# Patient Record
Sex: MALE | Race: WHITE | Employment: OTHER | URBAN - METROPOLITAN AREA
[De-identification: names, ages, dates, MRNs, and addresses within clinical notes are randomized per-mention and may not be internally consistent; named-entity substitution may affect disease eponyms.]

---

## 2017-02-27 ENCOUNTER — OFFICE VISIT (OUTPATIENT)
Dept: VASCULAR SURGERY | Age: 77
End: 2017-02-27

## 2017-02-27 DIAGNOSIS — I83.93 VARICOSE VEINS OF BOTH LOWER EXTREMITIES: Primary | ICD-10-CM

## 2017-02-27 DIAGNOSIS — I80.01 PHLEBITIS OF SUPERFICIAL VEIN OF LOWER EXTREMITY, RIGHT: ICD-10-CM

## 2017-02-27 NOTE — PROCEDURES
[facility]   *** FINAL REPORT ***    Name: Chuck Shay  MRN: [id number]       Outpatient  : 07 Aug 1940  HIS Order #: [id number]  [id number] [facility] Visit #: [id number]  Date: 2017    TYPE OF TEST: Peripheral Venous Testing    REASON FOR TEST  VV with pain, swelling or edema, Thrombosis, specified vein    Right Leg:-  Deep venous thrombosis:           No  Superficial venous thrombosis:    Yes  Deep venous insufficiency:        No  Superficial venous insufficiency: Yes    Vein Mapping:    Diam.   Depth  (mm)    Right Great Saphenous Vein:    High Thigh:      6.7    Mid Thigh:       4.2    Low Thigh:       3.9    Knee:            3.5    High Calf:       4.4    Low Calf: Ankle:    Right Small Saphenous Vein:    SPJ:    Mid Calf: Ankle:    G[place]omini:  Accessory saph.:  Mckinney :  Barnett :      INTERPRETATION/FINDINGS  Duplex images were obtained using 2-D gray scale, color flow and  spectral doppler analysis. The reflux exam was performed in the reverse trendelenburg position. Right leg :  1. No evidence of deep venous reflux or thrombosis detected in the  common femoral, femoral, profunda, popliteal, posterior tibial or  peroneal veins visualized. 2. Acute vs subacute occlusive superficial venous thrombosis  identified in the varicose veins in the lateral upper to mid calf  segment. Age of thrombus difficult to determine. 3. Incompetent  vein in the mid thigh measuring 4.3mm with  4.7 seconds of reflux with multiple varices extending laterally from  the mid thigh to the mid to distal calf. 4. Incompetent great saphenous vein with isolated reflux at the below  knee level with varices noted. No reflux detected in the SFJ or thigh  segments assessed. The GSV leaves the fascia in the upper to mid thigh   segment. 5. Small saphenous vein patent without evidence of reflux or thrombus  noted.   6. Multiphasic tibial doppler signals at rest.    ADDITIONAL COMMENTS  Bilateral order, only right leg completed. The patient was unable to  stay entire length of exam due to another appt/time restraint. Notified Dina at Dr. Sharri Wesley office of findings. The patient will  call back to schedule left leg for another date of service. I have personally reviewed the data relevant to the interpretation of  this  study. TECHNOLOGIST: Indira Carlos RDMS  Signed: 02/27/2017 03:07 PM    PHYSICIAN: Renay Ovalle D.O.   Signed: 02/28/2017 09:36 AM

## 2017-03-03 ENCOUNTER — OFFICE VISIT (OUTPATIENT)
Dept: VASCULAR SURGERY | Age: 77
End: 2017-03-03

## 2017-03-03 DIAGNOSIS — I83.93 VARICOSE VEINS OF BOTH LOWER EXTREMITIES: ICD-10-CM

## 2017-03-03 DIAGNOSIS — I87.2 VENOUS (PERIPHERAL) INSUFFICIENCY: Primary | ICD-10-CM

## 2017-03-03 NOTE — PROCEDURES
[facility]   *** FINAL REPORT ***    Name: Mitchell Guzman  MRN: [id number]       Outpatient  : 07 Aug 1940  HIS Order #: [id number]  [id number] [facility] Visit #: [id number]  Date: 03 Mar 2017    TYPE OF TEST: Peripheral Venous Testing    REASON FOR TEST  Varicose veins, Venous Insufficiency    Left Leg:-  Deep venous thrombosis:           No  Superficial venous thrombosis:    No  Deep venous insufficiency:        Not examined  Superficial venous insufficiency: Yes    Abnormal Valve Closure Times (seconds):    Left Femoral:         2.4    Left GSV mid:         3.3    Left GSV distal:      5.0      INTERPRETATION/FINDINGS  Duplex images were obtained using 2-D gray scale, color flow and  spectral doppler analysis. 1. No evidence of deep venous thrombosis in the left common femoral,  femoral, profunda, popliteal, posterior tibial and peroneal veins in  the lower extremity. Deep venous reflux noted in the left femoral  vein in reverse trendelenburg. 2. Patent contralateral femoral vein without evidence of thrombus. 3. No reflux detected in the left sapheno-femoral junction of the left   great saphenous vein in reverse trendelenburg. Reflux detected in  the remaining levels of the left great saphenous vein. 4. No reflux detected in the sapheno-popliteal junction in reverse  trendelenburg. No reflux detected in the remaining levels of the left   lesser saphenous vein. 5. Triphasic doppler signals in the tibial vessel at rest.  See worksheet for details. ADDITIONAL COMMENTS    I have personally reviewed the data relevant to the interpretation of  this  study. TECHNOLOGIST: Carmen Oneil RVT, BS  Signed: 2017 03:06 PM    PHYSICIAN: Josiah Luis MD  Signed: 2017 08:33 AM

## 2017-03-10 ENCOUNTER — OFFICE VISIT (OUTPATIENT)
Dept: VASCULAR SURGERY | Age: 77
End: 2017-03-10

## 2017-03-10 VITALS
SYSTOLIC BLOOD PRESSURE: 122 MMHG | RESPIRATION RATE: 18 BRPM | WEIGHT: 190 LBS | DIASTOLIC BLOOD PRESSURE: 80 MMHG | HEIGHT: 66 IN | BODY MASS INDEX: 30.53 KG/M2 | HEART RATE: 60 BPM

## 2017-03-10 DIAGNOSIS — I80.9 PHLEBITIS AND THROMBOPHLEBITIS: ICD-10-CM

## 2017-03-10 DIAGNOSIS — I83.12 VARICOSE VEINS OF BOTH LOWER EXTREMITIES WITH INFLAMMATION: Primary | ICD-10-CM

## 2017-03-10 DIAGNOSIS — I83.11 VARICOSE VEINS OF BOTH LOWER EXTREMITIES WITH INFLAMMATION: Primary | ICD-10-CM

## 2017-03-10 RX ORDER — METFORMIN HYDROCHLORIDE 500 MG/1
TABLET ORAL 2 TIMES DAILY WITH MEALS
COMMUNITY
End: 2018-08-28 | Stop reason: SDUPTHER

## 2017-03-10 NOTE — MR AVS SNAPSHOT
Visit Information Date & Time Provider Department Dept. Phone Encounter #  
 3/10/2017  9:00 AM Rudy Burgos, [address] Vein/[facility] [phone] [id number] Follow-up Instructions Return if symptoms worsen or fail to improve. Upcoming Health Maintenance Date Due ZOSTER VACCINE AGE 60> 8/7/2001 GLAUCOMA SCREENING Q2Y 8/7/2006 Pneumococcal 65+ Low/Medium Risk (1 of 2 - PCV13) 8/7/2006 MEDICARE YEARLY EXAM 8/7/2006 INFLUENZA AGE 9 TO ADULT 8/1/2016 DTaP/Tdap/Td series (2 - Td) 4/15/2025 Allergies as of 3/10/2017  Review Complete On: 3/10/2017 By: Nandini Tsang LPN No Known Allergies Current Immunizations  Never Reviewed Name Date Tdap 4/15/2015  4:54 PM  
  
 Not reviewed this visit You Were Diagnosed With   
  
 Codes Comments Varicose veins of both lower extremities with inflammation    -  Primary ICD-10-CM: I83.12, I83.11 ICD-9-CM: 454.1 Phlebitis and thrombophlebitis     ICD-10-CM: I80.9 ICD-9-CM: 451.9 Vitals BP Pulse Resp Height(growth percentile) Weight(growth percentile) BMI  
 122/80 (BP 1 Location: Right arm, BP Patient Position: Sitting) 60 18 5' 6\" (1.676 m) 190 lb (86.2 kg) 30.67 kg/m2 Smoking Status Former Smoker Vitals History BMI and BSA Data Body Mass Index Body Surface Area  
 30.67 kg/m 2 2 m 2 Your Updated Medication List  
  
   
This list is accurate as of: 3/10/17  9:37 AM.  Always use your most recent med list.  
  
  
  
  
 acetaminophen-codeine 300-30 mg per tablet Commonly known as:  TYLENOL-CODEINE #3 Take 1 Tab by mouth every four (4) hours as needed for Pain. Max Daily Amount: 6 Tabs. glyBURIDE-metFORMIN 5-500 mg per tablet Commonly known as:  Adelita Barnett Take 1 Tab by mouth Daily (before breakfast). KEFLEX 500 mg capsule Generic drug:  cephALEXin Take 500 mg by mouth two (2) times a day. lisinopril-hydroCHLOROthiazide 20-12.5 mg per tablet Commonly known as:  Gary Goodson Take  by mouth daily. metFORMIN 500 mg tablet Commonly known as:  GLUCOPHAGE Take  by mouth two (2) times daily (with meals). ZETIA 10 mg tablet Generic drug:  ezetimibe Take  by mouth. Follow-up Instructions Return if symptoms worsen or fail to improve. Introducing [facility] SERVICES! Dear Geni Rankin: Thank you for requesting a [employer] account. Our records indicate that you already have an active [employer] account. You can access your account anytime at [email or web address] Did you know that you can access your hospital and ER discharge instructions at any time in [employer]? You can also review all of your test results from your hospital stay or ER visit. Additional Information If you have questions, please visit the Frequently Asked Questions section of the [employer] website at [email or web address] Remember, [employer] is NOT to be used for urgent needs. For medical emergencies, dial 911. Now available from your iPhone and Android! Please provide this summary of care documentation to your next provider. Your primary care clinician is listed as [facility]. If you have any questions after today's visit, please call [phone].

## 2017-03-10 NOTE — PROGRESS NOTES
Micha Drummond    Chief Complaint   Patient presents with    New Patient    Varicose Veins       History and Physical    Mr Anjali Roque is here at the request of cardiology, Dr Hernandez Graft  He was recently in for a visit with him  He showed him concern of a group of veins in his right calf that had recently become painful and tender (about one month in duration now)  He states he was aware of varicose veins and some leg swelling for years but had never had any concerns related to the veins until this area of tenderness became bothersome  He states no trauma to his area. He does not wear compression or any type of socks, so that was not a cause. He just awoke one morning with the tenderness. He did not recall any skin changes or temperature of this area, just that it was a tender \"lump\"  So it was recognized that this was venous in nature so reflux studies were ordered before he came    Past Medical History:   Diagnosis Date    Diabetes ([facility] [address]     Hypertension      There is no problem list on file for this patient. Past Surgical History:   Procedure Laterality Date    HX KNEE REPLACEMENT Right     HX ORTHOPAEDIC      rt knee twice     Current Outpatient Prescriptions   Medication Sig Dispense Refill    metFORMIN (GLUCOPHAGE) 500 mg tablet Take  by mouth two (2) times daily (with meals).  cephALEXin (KEFLEX) 500 mg capsule Take 500 mg by mouth two (2) times a day.  lisinopril-hydrochlorothiazide (PRINZIDE, ZESTORETIC) 20-12.5 mg per tablet Take  by mouth daily.  ezetimibe (ZETIA) 10 mg tablet Take  by mouth.  glyBURIDE-metFORMIN (GLUCOVANCE) 5-500 mg per tablet Take 1 Tab by mouth Daily (before breakfast).  acetaminophen-codeine (TYLENOL-CODEINE #3) 300-30 mg per tablet Take 1 Tab by mouth every four (4) hours as needed for Pain. Max Daily Amount: 6 Tabs.  12 Tab 0     No Known Allergies  Social History     Social History    Marital status:      Spouse name: N/A    Number of children: N/A    Years of education: N/A     Occupational History    Not on file. Social History Main Topics    Smoking status: Former Smoker    Smokeless tobacco: Not on file    Alcohol use No    Drug use: No    Sexual activity: Not on file     Other Topics Concern    Not on file     Social History Narrative      No family history on file. Review of Systems    Review of Systems - History obtained from the patient  General ROS: negative  Psychological ROS: negative  Ophthalmic ROS: negative  Respiratory ROS: negative  Cardiovascular ROS: negative  Gastrointestinal ROS: negative  Musculoskeletal ROS: positive for - joint stiffness  Neurological ROS: negative  Dermatological ROS: negative  Vascular ROS: varicose veins and edema        Physical Exam:    Visit Vitals    /80 (BP 1 Location: Right arm, BP Patient Position: Sitting)    Pulse 60    Resp 18    Ht 5' 6\" (1.676 m)    Wt 190 lb (86.2 kg)    BMI 30.67 kg/m2      General:  Alert, cooperative, no distress. Head:  Normocephalic, without obvious abnormality, atraumatic. Eyes:    Conjunctivae/corneas clear. Pupils equal, round, reactive to light. Extraocular movements intact. Extremities: Extremities normal, atraumatic, no cyanosis, trace edema. He does have scattered small varicose veins throughout both legs. A cluster on lateral right calf is consistent with phlebitis, as described on doppler below   Pulses: 1+ and symmetric all extremities. Skin: Venous stasis pigmentation but no signs of cellulitis. Vascular studies:    1. No evidence of deep venous thrombosis in the left common femoral,  femoral, profunda, popliteal, posterior tibial and peroneal veins in  the lower extremity. Deep venous reflux noted in the left femoral  vein in reverse trendelenburg. 2. Patent contralateral femoral vein without evidence of thrombus.   3. No reflux detected in the left sapheno-femoral junction of the left  great saphenous vein in reverse trendelenburg. Reflux detected in  the remaining levels of the left great saphenous vein. 4. No reflux detected in the sapheno-popliteal junction in reverse  trendelenburg. No reflux detected in the remaining levels of the left  lesser saphenous vein. 5. Triphasic doppler signals in the tibial vessel at rest.    Right leg :  1. No evidence of deep venous reflux or thrombosis detected in the  common femoral, femoral, profunda, popliteal, posterior tibial or  peroneal veins visualized. 2. Acute vs subacute occlusive superficial venous thrombosis  identified in the varicose veins in the lateral upper to mid calf  segment. Age of thrombus difficult to determine. 3. Incompetent  vein in the mid thigh measuring 4.3mm with  4.7 seconds of reflux with multiple varices extending laterally from  the mid thigh to the mid to distal calf. 4. Incompetent great saphenous vein with isolated reflux at the below  knee level with varices noted. No reflux detected in the SFJ or thigh  segments assessed. The GSV leaves the fascia in the upper to mid thigh  segment. 5. Small saphenous vein patent without evidence of reflux or thrombus  noted. 6. Multiphasic tibial doppler signals at rest.     Impression and Plan:  1. Varicose veins of both lower extremities with inflammation    2. Phlebitis and thrombophlebitis      Orders Placed This Encounter    metFORMIN (GLUCOPHAGE) 500 mg tablet     Overall mr Richi Lyon is not bothered enough by any significant symptoms of his veins to be interested at this time for any vein procedures  I did explain what phlebitis is and how it is treated - warm compresses, ibuprofen as needed, and compression. He is adamant he will not wear any compression. So I suggested mostly the warm compresses. This should gradually resolve. It does seem somewhat better than it had been when he first noticed.    Being that he is not interested in procedures, I did still give him literature to review on venous disease and treatment and explained types of symptoms that can bother patients to consider other therapy. So, if he does experience any problems to let us know and we will be happy to see him back    Follow-up Disposition:  Return if symptoms worsen or fail to improve. DARSHANA Baptiste    Portions of this note have been created using voice recognition software.

## 2017-06-26 ENCOUNTER — HOSPITAL ENCOUNTER (OUTPATIENT)
Age: 77
Discharge: HOME | End: 2017-06-26
Attending: OTOLARYNGOLOGY
Payer: MEDICARE

## 2017-06-26 DIAGNOSIS — H91.8X1 TONE DEAFNESS, RIGHT: ICD-10-CM

## 2017-06-26 LAB — CREAT UR-MCNC: 1.2 MG/DL (ref 0.6–1.3)

## 2017-06-26 PROCEDURE — 74011250636 HC RX REV CODE- 250/636: Performed by: OTOLARYNGOLOGY

## 2017-06-26 PROCEDURE — 70553 MRI BRAIN STEM W/O & W/DYE: CPT

## 2017-06-26 PROCEDURE — A9585 GADOBUTROL INJECTION: HCPCS | Performed by: OTOLARYNGOLOGY

## 2017-06-26 PROCEDURE — 82565 ASSAY OF CREATININE: CPT

## 2017-06-26 RX ADMIN — GADOBUTROL 10 ML: 604.72 INJECTION INTRAVENOUS at 11:00

## 2017-10-23 ENCOUNTER — APPOINTMENT (OUTPATIENT)
Dept: CT IMAGING | Age: 77
DRG: 066 | End: 2017-10-23
Attending: EMERGENCY MEDICINE
Payer: MEDICARE

## 2017-10-23 ENCOUNTER — HOSPITAL ENCOUNTER (INPATIENT)
Age: 77
LOS: 2 days | Discharge: HOME | DRG: 066 | End: 2017-10-25
Attending: EMERGENCY MEDICINE | Admitting: HOSPITALIST
Payer: MEDICARE

## 2017-10-23 DIAGNOSIS — I63.9 CEREBROVASCULAR ACCIDENT (CVA), UNSPECIFIED MECHANISM (HCC): Primary | ICD-10-CM

## 2017-10-23 PROBLEM — R29.898 RIGHT HAND WEAKNESS: Status: ACTIVE | Noted: 2017-10-23

## 2017-10-23 LAB
ANION GAP SERPL CALC-SCNC: 7 MMOL/L (ref 3–18)
APTT PPP: 33 SEC (ref 23–36.4)
BASOPHILS # BLD: 0 K/UL (ref 0–0.06)
BASOPHILS NFR BLD: 1 % (ref 0–2)
BUN SERPL-MCNC: 26 MG/DL (ref 7–18)
BUN/CREAT SERPL: 20 (ref 12–20)
CALCIUM SERPL-MCNC: 9.1 MG/DL (ref 8.5–10.1)
CHLORIDE SERPL-SCNC: 101 MMOL/L (ref 100–108)
CK MB CFR SERPL CALC: 1.5 % (ref 0–4)
CK MB SERPL-MCNC: 2.3 NG/ML (ref 5–25)
CK SERPL-CCNC: 156 U/L (ref 39–308)
CO2 SERPL-SCNC: 29 MMOL/L (ref 21–32)
CREAT SERPL-MCNC: 1.32 MG/DL (ref 0.6–1.3)
DIFFERENTIAL METHOD BLD: ABNORMAL
EOSINOPHIL # BLD: 0.3 K/UL (ref 0–0.4)
EOSINOPHIL NFR BLD: 4 % (ref 0–5)
ERYTHROCYTE [DISTWIDTH] IN BLOOD BY AUTOMATED COUNT: 12.3 % (ref 11.6–14.5)
GLUCOSE BLD STRIP.AUTO-MCNC: 121 MG/DL (ref 70–110)
GLUCOSE SERPL-MCNC: 194 MG/DL (ref 74–99)
HCT VFR BLD AUTO: 39.6 % (ref 36–48)
HGB BLD-MCNC: 13.8 G/DL (ref 13–16)
INR PPP: 0.9 (ref 0.8–1.2)
LYMPHOCYTES # BLD: 2.3 K/UL (ref 0.9–3.6)
LYMPHOCYTES NFR BLD: 31 % (ref 21–52)
MCH RBC QN AUTO: 32.5 PG (ref 24–34)
MCHC RBC AUTO-ENTMCNC: 34.8 G/DL (ref 31–37)
MCV RBC AUTO: 93.2 FL (ref 74–97)
MONOCYTES # BLD: 0.8 K/UL (ref 0.05–1.2)
MONOCYTES NFR BLD: 11 % (ref 3–10)
NEUTS SEG # BLD: 3.9 K/UL (ref 1.8–8)
NEUTS SEG NFR BLD: 53 % (ref 40–73)
PLATELET # BLD AUTO: 263 K/UL (ref 135–420)
PMV BLD AUTO: 10.1 FL (ref 9.2–11.8)
POTASSIUM SERPL-SCNC: 4.2 MMOL/L (ref 3.5–5.5)
PROTHROMBIN TIME: 12.1 SEC (ref 11.5–15.2)
RBC # BLD AUTO: 4.25 M/UL (ref 4.7–5.5)
SODIUM SERPL-SCNC: 137 MMOL/L (ref 136–145)
TROPONIN I SERPL-MCNC: <0.02 NG/ML (ref 0–0.06)
WBC # BLD AUTO: 7.3 K/UL (ref 4.6–13.2)

## 2017-10-23 PROCEDURE — 74011250637 HC RX REV CODE- 250/637: Performed by: EMERGENCY MEDICINE

## 2017-10-23 PROCEDURE — 94762 N-INVAS EAR/PLS OXIMTRY CONT: CPT

## 2017-10-23 PROCEDURE — 85025 COMPLETE CBC W/AUTO DIFF WBC: CPT | Performed by: EMERGENCY MEDICINE

## 2017-10-23 PROCEDURE — 85730 THROMBOPLASTIN TIME PARTIAL: CPT | Performed by: EMERGENCY MEDICINE

## 2017-10-23 PROCEDURE — 80048 BASIC METABOLIC PNL TOTAL CA: CPT | Performed by: EMERGENCY MEDICINE

## 2017-10-23 PROCEDURE — 96361 HYDRATE IV INFUSION ADD-ON: CPT

## 2017-10-23 PROCEDURE — 99285 EMERGENCY DEPT VISIT HI MDM: CPT

## 2017-10-23 PROCEDURE — 85610 PROTHROMBIN TIME: CPT | Performed by: EMERGENCY MEDICINE

## 2017-10-23 PROCEDURE — 96360 HYDRATION IV INFUSION INIT: CPT

## 2017-10-23 PROCEDURE — 65660000000 HC RM CCU STEPDOWN

## 2017-10-23 PROCEDURE — 82962 GLUCOSE BLOOD TEST: CPT

## 2017-10-23 PROCEDURE — 93005 ELECTROCARDIOGRAM TRACING: CPT

## 2017-10-23 PROCEDURE — 82550 ASSAY OF CK (CPK): CPT | Performed by: EMERGENCY MEDICINE

## 2017-10-23 PROCEDURE — 70450 CT HEAD/BRAIN W/O DYE: CPT

## 2017-10-23 PROCEDURE — 74011250636 HC RX REV CODE- 250/636: Performed by: EMERGENCY MEDICINE

## 2017-10-23 RX ORDER — GUAIFENESIN 100 MG/5ML
243 LIQUID (ML) ORAL
Status: COMPLETED | OUTPATIENT
Start: 2017-10-23 | End: 2017-10-23

## 2017-10-23 RX ORDER — SODIUM CHLORIDE 9 MG/ML
125 INJECTION, SOLUTION INTRAVENOUS ONCE
Status: COMPLETED | OUTPATIENT
Start: 2017-10-23 | End: 2017-10-23

## 2017-10-23 RX ADMIN — SODIUM CHLORIDE 125 ML/HR: 9 INJECTION, SOLUTION INTRAVENOUS at 19:36

## 2017-10-23 RX ADMIN — ASPIRIN 81 MG 243 MG: 81 TABLET ORAL at 21:37

## 2017-10-23 NOTE — ED TRIAGE NOTES
Pt. Complains of arm going numb and not moving. He states he was not able to move his right arm at all. He states this happened Saturday. He states he is now able to move it, but he has no fine motor movements.

## 2017-10-23 NOTE — ED PROVIDER NOTES
HPI Comments: Adan Nance is a 69 y.o. male with hx of HTN and DM presenting to the ED with c/o right arm weakness and numbness. Pt states sx have had some improvement, however he is unable to write and perform other fine motor skills. Reports onset of sx was 2 days ago, states he was trimming bushes when all of a sudden his right hand went numb. Pt denies any hx of CVA, blood clots or other heart diseases. Notes he had a pain on the top center of his head, but resolved on its own. Pt denies vision changes, CP, SOB, trouble swallowing, dizziness, back pain, leg or arm sx. Pt states he took ASA PTA. Denies smoking, however pt states he used to chew tobacco years ago. Pt has no complaints at the moment. PCP: Ze Farley MD      The history is provided by the patient. Past Medical History:   Diagnosis Date    Diabetes ([facility] [address]     Hypertension        Past Surgical History:   Procedure Laterality Date    HX KNEE REPLACEMENT Right     HX ORTHOPAEDIC      rt knee twice         History reviewed. No pertinent family history. Social History     Social History    Marital status:      Spouse name: N/A    Number of children: N/A    Years of education: N/A     Occupational History    Not on file. Social History Main Topics    Smoking status: Former Smoker    Smokeless tobacco: Former User    Alcohol use No    Drug use: No    Sexual activity: Not on file     Other Topics Concern    Not on file     Social History Narrative         ALLERGIES: Review of patient's allergies indicates no known allergies. Review of Systems   Constitutional: Negative for diaphoresis and fever. HENT: Negative for congestion and trouble swallowing. Respiratory: Negative for cough and shortness of breath. Cardiovascular: Negative for chest pain and leg swelling. Gastrointestinal: Negative for abdominal pain and nausea. Musculoskeletal: Negative for back pain. Skin: Negative for rash. Neurological: Positive for weakness and numbness. Negative for dizziness and headaches. Right arm weakness and numbness      All other systems reviewed and are negative. Vitals:    10/23/17 2215 10/23/17 2244 10/24/17 0000 10/24/17 0200   BP: 138/83 138/83 146/82 115/65   Pulse: 82 78 70 70   Resp: 18 20 19 18   Temp:  98.2 °F (36.8 °C) 98.1 °F (36.7 °C) 97.8 °F (36.6 °C)   SpO2: (!) 89% 99% 95% 95%   Weight:   88.3 kg (194 lb 9.6 oz)    Height:   5' 6\" (1.676 m)             Physical Exam   Constitutional: He is oriented to person, place, and time. He appears well-developed and well-nourished. HENT:   Head: Normocephalic and atraumatic. Eyes: Pupils are equal, round, and reactive to light. Neck: Neck supple. Cardiovascular: Normal rate. No murmur heard. Pulmonary/Chest: Effort normal. He has no wheezes. Abdominal: Soft. There is no tenderness. Musculoskeletal: He exhibits no tenderness. Neurological: He is alert and oriented to person, place, and time. Normal sensation and strength    Skin: No pallor. Nursing note and vitals reviewed.        [facility]  ED Course       Procedures      Vitals:  Patient Vitals for the past 12 hrs:   Temp Pulse Resp BP SpO2   10/24/17 0200 97.8 °F (36.6 °C) 70 18 115/65 95 %   10/24/17 0000 98.1 °F (36.7 °C) 70 19 146/82 95 %   10/23/17 2244 98.2 °F (36.8 °C) 78 20 138/83 99 %   10/23/17 2215 - 82 18 138/83 (!) 89 %   10/23/17 2145 - 79 19 148/83 100 %   10/23/17 2130 - 83 16 152/90 95 %   10/23/17 2115 - 78 19 137/79 100 %   10/23/17 2100 - 75 14 135/76 97 %   10/23/17 2030 - 76 15 114/55 96 %   10/23/17 2015 - 77 18 106/57 94 %   10/23/17 2012 - 76 17 - 94 %   10/23/17 2010 - 75 11 - 97 %   10/23/17 2008 - - - 115/64 -   10/23/17 1945 - 78 18 109/60 96 %   10/23/17 1901 98.6 °F (37 °C) 88 20 126/75 96 %         Medications ordered:   Medications   lisinopril-hydroCHLOROthiazide (PRINZIDE, ZESTORETIC) 20-12.5 mg per tablet 1 Tab (not administered) ezetimibe (ZETIA) tablet 10 mg (not administered)   sodium chloride (NS) flush 5-10 mL (10 mL IntraVENous Given 10/24/17 0239)   sodium chloride (NS) flush 5-10 mL (not administered)   heparin (porcine) injection 5,000 Units (5,000 Units SubCUTAneous Given 10/24/17 0237)   aspirin (ASPIRIN) tablet 325 mg (not administered)   0.9% sodium chloride infusion (0 mL/hr IntraVENous Stopped 10/23/17 2248)   aspirin chewable tablet 243 mg (243 mg Oral Given 10/23/17 2137)         Lab findings:  Recent Results (from the past 12 hour(s))   CBC WITH AUTOMATED DIFF    Collection Time: 10/23/17  7:15 PM   Result Value Ref Range    WBC 7.3 4.6 - 13.2 K/uL    RBC 4.25 (L) 4.70 - 5.50 M/uL    HGB 13.8 13.0 - 16.0 g/dL    HCT 39.6 36.0 - 48.0 %    MCV 93.2 74.0 - 97.0 FL    MCH 32.5 24.0 - 34.0 PG    MCHC 34.8 31.0 - 37.0 g/dL    RDW 12.3 11.6 - 14.5 %    PLATELET 611 071 - 796 K/uL    MPV 10.1 9.2 - 11.8 FL    NEUTROPHILS 53 40 - 73 %    LYMPHOCYTES 31 21 - 52 %    MONOCYTES 11 (H) 3 - 10 %    EOSINOPHILS 4 0 - 5 %    BASOPHILS 1 0 - 2 %    ABS. NEUTROPHILS 3.9 1.8 - 8.0 K/UL    ABS. LYMPHOCYTES 2.3 0.9 - 3.6 K/UL    ABS. MONOCYTES 0.8 0.05 - 1.2 K/UL    ABS. EOSINOPHILS 0.3 0.0 - 0.4 K/UL    ABS.  BASOPHILS 0.0 0.0 - 0.06 K/UL    DF AUTOMATED     METABOLIC PANEL, BASIC    Collection Time: 10/23/17  7:15 PM   Result Value Ref Range    Sodium 137 136 - 145 mmol/L    Potassium 4.2 3.5 - 5.5 mmol/L    Chloride 101 100 - 108 mmol/L    CO2 29 21 - 32 mmol/L    Anion gap 7 3.0 - 18 mmol/L    Glucose 194 (H) 74 - 99 mg/dL    BUN 26 (H) 7.0 - 18 MG/DL    Creatinine 1.32 (H) 0.6 - 1.3 MG/DL    BUN/Creatinine ratio 20 12 - 20      GFR est AA >60 >60 ml/min/1.73m2    GFR est non-AA 53 (L) >60 ml/min/1.73m2    Calcium 9.1 8.5 - 10.1 MG/DL   CARDIAC PANEL,(CK, CKMB & TROPONIN)    Collection Time: 10/23/17  7:15 PM   Result Value Ref Range     39 - 308 U/L    CK - MB 2.3 <3.6 ng/ml    CK-MB Index 1.5 0.0 - 4.0 %    Troponin-I, Qt. <0.02 0.00 - 0.06 NG/ML   PROTHROMBIN TIME + INR    Collection Time: 10/23/17  7:15 PM   Result Value Ref Range    Prothrombin time 12.1 11.5 - 15.2 sec    INR 0.9 0.8 - 1.2     PTT    Collection Time: 10/23/17  7:15 PM   Result Value Ref Range    aPTT 33.0 23.0 - 36.4 SEC   EKG, 12 LEAD, INITIAL    Collection Time: 10/23/17  7:24 PM   Result Value Ref Range    Ventricular Rate 86 BPM    Atrial Rate 86 BPM    P-R Interval 180 ms    QRS Duration 90 ms    Q-T Interval 366 ms    QTC Calculation (Bezet) 437 ms    Calculated P Axis 45 degrees    Calculated R Axis 0 degrees    Calculated T Axis 45 degrees    Diagnosis       Normal sinus rhythm  Normal ECG  When compared with ECG of 21-AUG-2013 09:59,  No significant change was found     GLUCOSE, POC    Collection Time: 10/23/17 11:38 PM   Result Value Ref Range    Glucose (POC) 121 (H) 70 - 110 mg/dL           X-Ray, CT or other radiology findings or impressions:  CT HEAD WO CONT   No evidence of intracranial hemorrhages.     Finding suspicious for acute lacunar infarction in right side of mid mary. Progress notes, Consult notes or additional Procedure notes:     8:35 PM Consult: I discussed care with Dr. Byron Ferrer (Teleneurology). It was a standard discussion including patient history, chief complaint, available diagnostic results, and predicted treatment course. Will consult pt.     8:56 PM Consult: I discussed care with Dr. Byron Ferrer (Teleneurology). It was a standard discussion including patient history, chief complaint, available diagnostic results, and predicted treatment course. Recommends admission and ASA/Lipitor. 9:17 PM Consult: I discussed care with Dr. Cynthia Gavin (Hospitalist). It was a standard discussion including patient history, chief complaint, available diagnostic results, and predicted treatment course. Agrees to admit pt. Disposition:  Diagnosis:   1.  Cerebrovascular accident (CVA), unspecified mechanism ([facility] [address]        Disposition: Admit    Follow-up Information     None           Current Discharge Medication List      CONTINUE these medications which have NOT CHANGED    Details   metFORMIN (GLUCOPHAGE) 500 mg tablet Take  by mouth two (2) times daily (with meals). lisinopril-hydrochlorothiazide (PRINZIDE, ZESTORETIC) 20-12.5 mg per tablet Take  by mouth daily. ezetimibe (ZETIA) 10 mg tablet Take  by mouth.      glyBURIDE-metFORMIN (GLUCOVANCE) 5-500 mg per tablet Take 1 Tab by mouth Daily (before breakfast). [facility] acting as a scribe for and in the presence of Carmen Mcdermott MD      October 23, 2017 at 7:10 PM       Provider Attestation:      I personally performed the services described in the documentation, reviewed the documentation, as recorded by the scribe in my presence, and it accurately and completely records my words and actions.  October 23, 2017 at 7:10 PM - Carmen Mcdermott MD                           .

## 2017-10-23 NOTE — IP AVS SNAPSHOT
[address] [address] Sarah Atwood Patient: Nicolle Chris MRN: [id number] JFD:[birth year] My Medications STOP taking these medications ZETIA 10 mg tablet Generic drug:  ezetimibe TAKE these medications as instructed Instructions Each Dose to Equal  
 Morning Noon Evening Bedtime  
 aspirin delayed-release 81 mg tablet Your last dose was: Your next dose is: Take 1 Tab by mouth daily. 81 mg  
    
   
   
   
  
 atorvastatin 40 mg tablet Commonly known as:  LIPITOR Your last dose was: Your next dose is: Take 1 Tab by mouth daily. 40 mg  
    
   
   
   
  
 clopidogrel 75 mg Tab Commonly known as:  PLAVIX Start taking on:  10/26/2017 Your last dose was: Your next dose is: Take 1 Tab by mouth daily. 75 mg  
    
   
   
   
  
 glyBURIDE-metFORMIN 5-500 mg per tablet Commonly known as:  Nickola Paco Your last dose was: Your next dose is: Take 1 Tab by mouth Daily (before breakfast). 1 Tab  
    
   
   
   
  
 lisinopril-hydroCHLOROthiazide 20-12.5 mg per tablet Commonly known as:  Leander Mg Your last dose was: Your next dose is: Take  by mouth daily. metFORMIN 500 mg tablet Commonly known as:  GLUCOPHAGE Your last dose was: Your next dose is: Take  by mouth two (2) times daily (with meals). Where to Get Your Medications Information on where to get these meds will be given to you by the nurse or doctor. ! Ask your nurse or doctor about these medications  
  aspirin delayed-release 81 mg tablet  
 atorvastatin 40 mg tablet  
 clopidogrel 75 mg Tab

## 2017-10-23 NOTE — IP AVS SNAPSHOT
Summary of Care Report The Summary of Care report has been created to help improve care coordination. Users with access to [employer] or [address] (Web-based application) may access additional patient information including the Discharge Summary. If you are not currently a [address] user and need more information, please call the number listed below in the [place] [address] section and ask to be connected with Medical Records. Facility Information Name Address Phone [address] [facility] [address] [phone] Patient Information Patient Name Sex  Louis Mcdonough ([id number]) Male 1940 Discharge Information Admitting Provider Service Area Unit Dorothy Rene MD / [address] [facility] / [phone] Discharge Provider Discharge Date/Time Discharge Disposition Destination (none) 10/25/2017 (Pending) AHR (none) Patient Language Language ENGLISH [13] Hospital Problems as of 10/25/2017  Reviewed: 2016  7:23 AM by Sudhakar Slade MD  
  
  
  
 Class Noted - Resolved Last Modified POA Active Problems Right hand weakness  10/23/2017 - Present 10/23/2017 by Benjamin Stevenson MD Unknown Entered by Benjamin Stevenson MD  
  CVA (cerebral vascular accident) [facility])  10/24/2017 - Present 10/24/2017 by Dorothy Rene MD Unknown Entered by Dorothy Rene MD  
  
Non-Hospital Problems as of 10/25/2017  Reviewed: 2016  7:23 AM by Sudhakar Slade MD  
 None You are allergic to the following No active allergies Current Discharge Medication List  
  
START taking these medications Dose & Instructions Dispensing Information Comments  
 aspirin delayed-release 81 mg tablet Dose:  81 mg Take 1 Tab by mouth daily. Quantity:  90 Tab Refills:  0 atorvastatin 40 mg tablet Commonly known as:  LIPITOR Dose:  40 mg Take 1 Tab by mouth daily. Quantity:  30 Tab Refills:  0  
   
 clopidogrel 75 mg Tab Commonly known as:  PLAVIX Start taking on:  10/26/2017 Dose:  75 mg Take 1 Tab by mouth daily. Quantity:  90 Tab Refills:  0 CONTINUE these medications which have NOT CHANGED Dose & Instructions Dispensing Information Comments  
 glyBURIDE-metFORMIN 5-500 mg per tablet Commonly known as:  Lucy Lee Dose:  1 Tab Take 1 Tab by mouth Daily (before breakfast). Refills:  0  
   
 lisinopril-hydroCHLOROthiazide 20-12.5 mg per tablet Commonly known as:  Kathalene Rockers Take  by mouth daily. Refills:  0  
   
 metFORMIN 500 mg tablet Commonly known as:  GLUCOPHAGE Take  by mouth two (2) times daily (with meals). Refills:  0 STOP taking these medications Comments ZETIA 10 mg tablet Generic drug:  ezetimibe Current Immunizations Name Date Tdap 4/15/2015 Follow-up Information Follow up With Details Comments Contact Info Diane Moody MD  waiting for a return call from this office. Placed call twice, w/no answer.[facility] [address] [facility] [address] 
[phone] Discharge Instructions Patient armband removed and shredded [employer] Activation Thank you for requesting access to [employer]. Please follow the instructions below to securely access and download your online medical record. [employer] allows you to send messages to your doctor, view your test results, renew your prescriptions, schedule appointments, and more. How Do I Sign Up? 1. In your internet browser, go to [email or web address] 
2. Click on the First Time User? Click Here link in the Sign In box. You will be redirect to the New Member Sign Up page. 3. Enter your [employer] Access Code exactly as it appears below.  You will not need to use this code after youve completed the sign-up process. If you do not sign up before the expiration date, you must request a new code. [employer] Access Code: Activation code not generated Current [employer] Status: Active (This is the date your [employer] access code will ) 4. Enter the last four digits of your Social Security Number (xxxx) and Date of Birth (mm/dd/yyyy) as indicated and click Submit. You will be taken to the next sign-up page. 5. Create a DGITt ID. This will be your [employer] login ID and cannot be changed, so think of one that is secure and easy to remember. 6. Create a [employer] password. You can change your password at any time. 7. Enter your Password Reset Question and Answer. This can be used at a later time if you forget your password. 8. Enter your e-mail address. You will receive e-mail notification when new information is available in [address] 9. Click Sign Up. You can now view and download portions of your medical record. 10. Click the Download Summary menu link to download a portable copy of your medical information. Additional Information If you have questions, please visit the Frequently Asked Questions section of the [employer] website at [email or web address] Remember, [employer] is NOT to be used for urgent needs. For medical emergencies, dial 911. Stroke: Care Instructions Your Care Instructions You have had a stroke. This means that the blood flow to a part of your brain was blocked for some time, which damages the nerve cells in that part of the brain. The part of your body controlled by that part of your brain may not function properly now. The brain is an amazing organ that can heal itself to some degree. The stroke you had damaged part of your brain. But other parts of your brain may take over in some way for the damaged areas. You have already started this process. Your doctor will talk with you about what you can do to prevent another stroke. High blood pressure, high cholesterol, and diabetes are all risk factors for stroke. If you have any of these conditions, work with your doctor to make sure they are under control. Other risk factors for stroke include being overweight, smoking, and not getting regular exercise. Going home may be hard for you and your family. The more you can try to do for yourself, the better. Remember to take each day one at a time. Follow-up care is a key part of your treatment and safety. Be sure to make and go to all appointments, and call your doctor if you are having problems. It's also a good idea to know your test results and keep a list of the medicines you take. How can you care for yourself at home? · Enter a stroke rehabilitation (rehab) program, if your doctor recommends it. Physical, speech, and occupational therapies can help you manage bathing, dressing, eating, and other basics of daily living. · Do not drive until your doctor says it is okay. · It is normal to feel sad or depressed after a stroke. If these feelings last, talk to your doctor. · If you are having problems with urine leakage, go to the bathroom at regular times, including when you first wake up and at bedtime. Also, limit fluids after dinner. · If you are constipated, drink plenty of fluids, enough so that your urine is light yellow or clear like water. If you have kidney, heart, or liver disease and have to limit fluids, talk with your doctor before you increase the amount of fluids you drink. Set up a regular time for using the toilet. If you continue to have constipation, your doctor may suggest using a bulking agent, such as Metamucil, or a stool softener, laxative, or enema. Medicines · Take your medicines exactly as prescribed. Call your doctor if you think you are having a problem with your medicine.  You may be taking several medicines. ACE (angiotensin-converting enzyme) inhibitors, angiotensin II receptor blockers (ARBs), beta-blockers, diuretics (water pills), and calcium channel blockers control your blood pressure. Statins help lower cholesterol. Your doctor may also prescribe medicines for depression, pain, sleep problems, anxiety, or agitation. · If your doctor has given you a blood thinner to prevent another stroke, be sure you get instructions about how to take your medicine safely. Blood thinners can cause serious bleeding problems. · Do not take any over-the-counter medicines or herbal products without talking to your doctor first. 
· If you take birth control pills or hormone therapy, talk to your doctor about whether they are right for you. For family members and caregivers · Make the home safe. Set up a room so that your loved one does not have to climb stairs. Be sure the bathroom is on the same floor. Move throw rugs and furniture that could cause falls. Make sure that the lighting is good. Put grab bars and seats in tubs and showers. · Find out what your loved one can do and what he or she needs help with. Try not to do things for your loved one that your loved one can do on his or her own. Help him or her learn and practice new skills. · Visit and talk with your loved one often. Try doing activities together that you both enjoy, such as playing cards or board games. Keep in touch with your loved one's friends as much as you can. Encourage them to visit. · Take care of yourself. Do not try to do everything yourself. Ask other family members to help. Eat well, get enough rest, and take time to do things that you enjoy. Keep up with your own doctor visits, and make sure to take your medicines regularly. Get out of the house as much as you can. Join a local support group. Find out if you qualify for home health care visits to help with rehab or for adult day care. When should you call for help? Call 911 anytime you think you may need emergency care. For example, call if: 
· You have signs of another stroke. These may include: 
¨ Sudden numbness, tingling, weakness, or loss of movement in your face, arm, or leg, especially on only one side of your body. ¨ Sudden vision changes. ¨ Sudden trouble speaking. ¨ Sudden confusion or trouble understanding simple statements. ¨ Sudden problems with walking or balance. ¨ A sudden, severe headache that is different from past headaches. Call 911 even if these symptoms go away in a few minutes. Call your doctor now or seek immediate medical care if: 
· You have new symptoms that may be related to your stroke, such as falls or trouble swallowing. Watch closely for changes in your health, and be sure to contact your doctor if you have any problems. Where can you learn more? Go to http://mic-ezequiel.info/. Enter T597 in the search box to learn more about \"Stroke: Care Instructions. \" Current as of: March 20, 2017 Content Version: 11.3 © 3548-2746 [employer]. Care instructions adapted under license by [employer] (which disclaims liability or warranty for this information). If you have questions about a medical condition or this instruction, always ask your healthcare professional. [address] [address] any warranty or liability for your use of this information. DISCHARGE SUMMARY from Nurse PATIENT INSTRUCTIONS: 
 
 
F-face looks uneven A-arms unable to move or move unevenly S-speech slurred or non-existent T-time-call 911 as soon as signs and symptoms begin-DO NOT go Back to bed or wait to see if you get better-TIME IS BRAIN. Warning Signs of HEART ATTACK Call 911 if you have these symptoms: 
? Chest discomfort. Most heart attacks involve discomfort in the center of the chest that lasts more than a few minutes, or that goes away and comes back. It can feel like uncomfortable pressure, squeezing, fullness, or pain. ? Discomfort in other areas of the upper body. Symptoms can include pain or discomfort in one or both arms, the back, neck, jaw, or stomach. ? Shortness of breath with or without chest discomfort. ? Other signs may include breaking out in a cold sweat, nausea, or lightheadedness. Don't wait more than five minutes to call [address]! Fast action can save your life. Calling 911 is almost always the fastest way to get lifesaving treatment. Emergency Medical Services staff can begin treatment when they arrive  up to an hour sooner than if someone gets to the hospital by car. The discharge information has been reviewed with the patient. The patient verbalized understanding. Discharge medications reviewed with the patient and appropriate educational materials and side effects teaching were provided. ___________________________________________________________________________________________________________________________________ Chart Review Routing History Recipient Method Report Sent By Krystle Roberts MD  
Phone: [phone] In Basket Notes [address] [facility], [address] 3/10/2017  1:55 PM 3/10/2017 Rich Solitario MD  
Fax: [phone] Phone: [phone] Fax Notes Report [facility], [address] 3/10/2017  1:55 PM 3/10/2017 Gianna Rosas MD  
Phone: [phone] In [employer] IP [employer] Routed [facility] Nickolas JIMENEZ MD [[id number]] 10/25/2017  1:33 PM 10/25/2017

## 2017-10-23 NOTE — ED NOTES
Pt awake/alert/conversant. Denies discomfort/distress at this time.   Per Dr Chris Crew pt is not a Code S.

## 2017-10-24 ENCOUNTER — APPOINTMENT (OUTPATIENT)
Dept: MRI IMAGING | Age: 77
DRG: 066 | End: 2017-10-24
Attending: HOSPITALIST
Payer: MEDICARE

## 2017-10-24 PROBLEM — I63.9 CVA (CEREBRAL VASCULAR ACCIDENT) (HCC): Status: ACTIVE | Noted: 2017-10-24

## 2017-10-24 LAB
ATRIAL RATE: 86 BPM
CALCULATED P AXIS, ECG09: 45 DEGREES
CALCULATED R AXIS, ECG10: 0 DEGREES
CALCULATED T AXIS, ECG11: 45 DEGREES
DIAGNOSIS, 93000: NORMAL
GLUCOSE BLD STRIP.AUTO-MCNC: 162 MG/DL (ref 70–110)
GLUCOSE BLD STRIP.AUTO-MCNC: 165 MG/DL (ref 70–110)
GLUCOSE BLD STRIP.AUTO-MCNC: 170 MG/DL (ref 70–110)
GLUCOSE BLD STRIP.AUTO-MCNC: 188 MG/DL (ref 70–110)
P-R INTERVAL, ECG05: 180 MS
Q-T INTERVAL, ECG07: 366 MS
QRS DURATION, ECG06: 90 MS
QTC CALCULATION (BEZET), ECG08: 437 MS
VENTRICULAR RATE, ECG03: 86 BPM

## 2017-10-24 PROCEDURE — 97535 SELF CARE MNGMENT TRAINING: CPT

## 2017-10-24 PROCEDURE — 74011250637 HC RX REV CODE- 250/637: Performed by: HOSPITALIST

## 2017-10-24 PROCEDURE — 97165 OT EVAL LOW COMPLEX 30 MIN: CPT

## 2017-10-24 PROCEDURE — 65660000000 HC RM CCU STEPDOWN

## 2017-10-24 PROCEDURE — 70544 MR ANGIOGRAPHY HEAD W/O DYE: CPT

## 2017-10-24 PROCEDURE — 93880 EXTRACRANIAL BILAT STUDY: CPT

## 2017-10-24 PROCEDURE — 70551 MRI BRAIN STEM W/O DYE: CPT

## 2017-10-24 PROCEDURE — 82962 GLUCOSE BLOOD TEST: CPT

## 2017-10-24 PROCEDURE — 74011250636 HC RX REV CODE- 250/636: Performed by: HOSPITALIST

## 2017-10-24 PROCEDURE — 97161 PT EVAL LOW COMPLEX 20 MIN: CPT

## 2017-10-24 PROCEDURE — 92610 EVALUATE SWALLOWING FUNCTION: CPT

## 2017-10-24 RX ORDER — SODIUM CHLORIDE 0.9 % (FLUSH) 0.9 %
5-10 SYRINGE (ML) INJECTION EVERY 8 HOURS
Status: DISCONTINUED | OUTPATIENT
Start: 2017-10-24 | End: 2017-10-25 | Stop reason: HOSPADM

## 2017-10-24 RX ORDER — SODIUM CHLORIDE 0.9 % (FLUSH) 0.9 %
5-10 SYRINGE (ML) INJECTION AS NEEDED
Status: DISCONTINUED | OUTPATIENT
Start: 2017-10-24 | End: 2017-10-25 | Stop reason: HOSPADM

## 2017-10-24 RX ORDER — LISINOPRIL AND HYDROCHLOROTHIAZIDE 12.5; 2 MG/1; MG/1
1 TABLET ORAL DAILY
Status: DISCONTINUED | OUTPATIENT
Start: 2017-10-24 | End: 2017-10-25 | Stop reason: HOSPADM

## 2017-10-24 RX ORDER — HEPARIN SODIUM 5000 [USP'U]/ML
5000 INJECTION, SOLUTION INTRAVENOUS; SUBCUTANEOUS EVERY 8 HOURS
Status: DISCONTINUED | OUTPATIENT
Start: 2017-10-24 | End: 2017-10-25 | Stop reason: HOSPADM

## 2017-10-24 RX ORDER — ASPIRIN 325 MG
325 TABLET ORAL DAILY
Status: DISCONTINUED | OUTPATIENT
Start: 2017-10-24 | End: 2017-10-25 | Stop reason: HOSPADM

## 2017-10-24 RX ORDER — CLOPIDOGREL BISULFATE 75 MG/1
75 TABLET ORAL DAILY
Status: DISCONTINUED | OUTPATIENT
Start: 2017-10-25 | End: 2017-10-25 | Stop reason: HOSPADM

## 2017-10-24 RX ORDER — EZETIMIBE 10 MG/1
10 TABLET ORAL DAILY
Status: DISCONTINUED | OUTPATIENT
Start: 2017-10-24 | End: 2017-10-25 | Stop reason: HOSPADM

## 2017-10-24 RX ORDER — GLYBURIDE 2.5 MG/1
5 TABLET ORAL
Status: DISCONTINUED | OUTPATIENT
Start: 2017-10-24 | End: 2017-10-25 | Stop reason: HOSPADM

## 2017-10-24 RX ADMIN — Medication 10 ML: at 09:39

## 2017-10-24 RX ADMIN — HEPARIN SODIUM 5000 UNITS: 5000 INJECTION, SOLUTION INTRAVENOUS; SUBCUTANEOUS at 19:01

## 2017-10-24 RX ADMIN — Medication 10 ML: at 14:00

## 2017-10-24 RX ADMIN — HEPARIN SODIUM 5000 UNITS: 5000 INJECTION, SOLUTION INTRAVENOUS; SUBCUTANEOUS at 02:37

## 2017-10-24 RX ADMIN — HEPARIN SODIUM 5000 UNITS: 5000 INJECTION, SOLUTION INTRAVENOUS; SUBCUTANEOUS at 08:26

## 2017-10-24 RX ADMIN — Medication 10 ML: at 21:28

## 2017-10-24 RX ADMIN — Medication 10 ML: at 21:27

## 2017-10-24 RX ADMIN — LISINOPRIL AND HYDROCHLOROTHIAZIDE 1 TABLET: 12.5; 2 TABLET ORAL at 08:26

## 2017-10-24 RX ADMIN — ASPIRIN 325 MG ORAL TABLET 325 MG: 325 PILL ORAL at 08:26

## 2017-10-24 RX ADMIN — GLYBURIDE 5 MG: 2.5 TABLET ORAL at 06:45

## 2017-10-24 RX ADMIN — EZETIMIBE 10 MG: 10 TABLET ORAL at 08:26

## 2017-10-24 RX ADMIN — Medication 10 ML: at 02:39

## 2017-10-24 NOTE — ED NOTES
Pt awake/alert/oriented/conversant. Affect calm, respirations regular/non labored; has given consent for transfer. Able to sign with affected hand; difficulty making a tight  but without distress. No distress noted.

## 2017-10-24 NOTE — PROGRESS NOTES
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 The plan of care has been discussed with the patient and all questions are answered. Reynold. [address] [facility]  Office [phone]  Fax [phone]      No Known Allergies        Subjective:      Pt seen and examined. Wife at bedside. Feeling ok today. Has been working with therapy on strength training for his right hand. 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 Overall in good spirits. Has not noticed any new areas of weakness or numbness. No other questions.          Objective:        Visit Vitals    /80 (BP 1 Location: Left arm, BP Patient Position: Sitting)    Pulse 67    Temp 97.6 °F (36.4 °C)    Resp 19    Ht 5' 6\" (1.676 m)    Wt 88.3 kg (194 lb 9.6 oz)    SpO2 96%    BMI 31.41 kg/m2     Temp (24hrs), Av °F (36.7 °C), Min:97.5 °F (36.4 °C), Max:98.6 °F (37 °C)        General:   awake alert and oriented, non-toxic   Skin:   no rashes or skin lesions noted on limited exam, dry and warm   HEENT:  No scleral icterus or pallor; oral mucosa moist, lips moist   Lymph Nodes:   not assessed today   Lungs:   non, labored; bilaterally clear to aspiration- no crackles wheezes rales or rhonchi   Heart:  RRR, s1 and s2; no murmurs rubs or gallops; no edema, + pedal pulses   Abdomen:  soft, non-distended, active bowel sounds, non-tender   Genitourinary:  deferred   Extremities:   average muscle tone; no contractures, no joint effusions   Neurologic:  No gross focal sensory abnormalities; CN 2-12 intact; Follows commands. Ambulates with limp. 4/5 strength in right hand , no obvious tremor   Psychiatric:   appropriate and interactive. Labs: Results:   Chemistry Recent Labs      10/23/17   1915   GLU  194*   NA  137   K  4.2   CL  101   CO2  29   BUN  26*   CREA  1.32*   CA  9.1   AGAP  7   BUCR  20      CBC w/Diff Recent Labs      10/23/17   1915   WBC  7.3   RBC  4.25*   HGB  13.8   HCT  39.6   PLT  263   GRANS  53   LYMPH  31   EOS  4        Lab Results   Component Value Date/Time    Specimen Description: KNEE RIGHT 09/11/2012 08:57 AM    Specimen Description: FLUID RIGHT KNEE 09/11/2012 08:57 AM    Specimen Description: FLUID RIGHT KNEE 09/11/2012 08:57 AM    Specimen Description: FLUID RIGHT KNEE 09/11/2012 08:57 AM    Specimen Description: ASPIRATE RIGHT KNEE 08/13/2012 05:00 PM    Specimen Description: ASPIRATE RIGHT KNEE 08/13/2012 05:00 PM    Lab Results   Component Value Date/Time    Culture result: NO GROWTH 5 DAYS 09/11/2012 08:57 AM    Culture result: NO ANAEROBES ISOLATED 5 DAYS 09/11/2012 08:57 AM    Culture result: NO FUNGUS ISOLATED 34 DAYS 09/11/2012 08:57 AM    Culture result:  09/11/2012 08:57 AM     NO AFB ISOLATED AT 8 WEEKS TEST PERFORMED AT [address] [place] [address] GEN. HOSP. LAB    Culture result: NO GROWTH 5 DAYS 08/13/2012 05:00 PM    Culture result: NO ANAEROBES ISOLATED 5 DAYS 08/13/2012 05:00 PM          Imaging:   10/23 CT head: No evidence of intracranial hemorrhages.   Finding suspicious for acute lacunar infarction in right side of mid mary.   MRI of brain is suggested for further evaluation. 10/24 carotid US: 1. Bilateral <50% stenosis of the internal carotid arteries.   2. No significant stenosis in the external carotid arteries  bilaterally. 3. Antegrade flow in both vertebral arteries. 4. Normal flow in both subclavian arteries. Plaque Morphology:  1. Calcified plaque in the bulb and right ICA. 2. Heterogeneous plaque in the bulb and left ICA.

## 2017-10-24 NOTE — ED NOTES
Patient resting in bed. No s/sx of distress noted. Patient denies needs or concerns. Cardiac monitoring continues. IVF infusing as directed without complications noted.

## 2017-10-24 NOTE — ROUTINE PROCESS
TRANSFER - OUT REPORT:    Verbal report given to Vipin Morgan RN(name) on [place]  being transferred to [facility] [address] [phone]) (unit) for routine progression of care for CVA evaluation/treatment      Report consisted of patients Situation, Background, Assessment and   Recommendations(SBAR). Information from the following report(s) ED Summary was reviewed with the receiving nurse. Included hx/allergies/presentation/current NIHSS. Lines:   Peripheral IV 10/23/17 Right Antecubital (Active)   Site Assessment Clean, dry, & intact 10/23/2017  7:22 PM   Phlebitis Assessment 0 10/23/2017  7:22 PM   Infiltration Assessment 0 10/23/2017  7:22 PM   Dressing Status Clean, dry, & intact 10/23/2017  7:22 PM   Dressing Type Tape;Transparent 10/23/2017  7:22 PM   Hub Color/Line Status Flushed;Patent 10/23/2017  7:22 PM   Action Taken Blood drawn 10/23/2017  7:22 PM        Opportunity for questions and clarification was provided.       Patient transported with:   Monitor/saline lock

## 2017-10-24 NOTE — NURSE NAVIGATOR
Spoke with patient in room and he was able to verify address and phone # as correct on face sheet. He uses [facility] [address] as his pharmacy. He stated that he has no issues with transportation and that he drives. He verified that his PCP is Dr. Victoriano Hoyt. He has a walker and a cane from prior knee replacement surgery, but he does not currently use them. He stated that his wife is his  Marla Moore [phone]. He plans on returning home upon discharge and he does not feel he needs Home Health at this time. He stated that he is independent with his ADL's.

## 2017-10-24 NOTE — PROGRESS NOTES
Problem: Self Care Deficits Care Plan (Adult)  Goal: *Acute Goals and Plan of Care (Insert Text)  Occupational Therapy Goals  Initiated 10/24/2017     1. Patient will perform self-feeding with modified independence with adaptive equipment -achieved 10/24/17   Outcome: Resolved/Met Date Met: 10/24/17  Occupational Therapy EVALUATION/discharge    Patient: Yobani Beebe (28 y.o. male)  Date: 10/24/2017  Primary Diagnosis: Right hand weakness  CVA (cerebral vascular accident) [facility])        Precautions:  Fall    ASSESSMENT AND RECOMMENDATIONS:  Based on the objective data described below, the patient was in the chair upon arrival. Patient has [facility] AROM and strength, except for 3+/5 R hand with decreased strength in right intrinsic muscles. Patient educated on digit abduction/abbduction -intrinsic exercises; patient was able to report/demonstrate understanding with mod I. Patient was able to simulate toilet transfer with independence and subha shoes/shoelaces with mod I. Patient reported he fed himself using his L/non-dominant hand; educated patient on built up handle for self-feeding; patient was able to demonstrated using R hand with mod I. Patient issued built up handle and left comfortable in chair. Skilled acute care occupational therapy is not indicated at this time. Discharge Recommendations: Outpatient hand therapy/R hand therapy  Further Equipment Recommendations for Discharge: N/A      Barriers to Learning/Limitations: None    COMPLEXITY     Eval Complexity: History: LOW Complexity : Brief history review ; Examination: LOW Complexity : 1-3 performance deficits relating to physical, cognitive , or psychosocial skils that result in activity limitations and / or participation restrictions ;  Decision Making:LOW Complexity : No comorbidities that affect functional and no verbal or physical assistance needed to complete eval tasks  Assessment: Low Complexity        G-CODES:     Self Care  Current  CI= 1-19%   Goal  CI= 1-19%   D/C  CI= 1-19%. The severity rating is based on the Level of Assistance required for Functional Mobility and ADLs. SUBJECTIVE:   Patient stated This helps a lot.  (referring to built up handle)    OBJECTIVE DATA SUMMARY:     Past Medical History:   Diagnosis Date    Diabetes ([facility] [address]     Hypertension      Past Surgical History:   Procedure Laterality Date    HX KNEE REPLACEMENT Right     HX ORTHOPAEDIC      rt knee twice     Prior Level of Function/Home Situation: Patient reported he was independent in basic self care tasks and functional mobility PTA. Home Situation  Home Environment: Private residence  # Steps to Enter: 4  One/Two Story Residence: Two story  # of Interior Steps: 15  Interior Rails: Right  Lift Chair Available: No  Living Alone: No  Support Systems: Family member(s)  Patient Expects to be Discharged to[de-identified] Private residence  Current DME Used/Available at Home: dylan Cruz Johnette Severe, rolling  [x]     Right hand dominant   []     Left hand dominant  Cognitive/Behavioral Status:  Neurologic State: Alert  Orientation Level: Oriented X4  Cognition: Follows commands    Skin: No skin changes noted    Edema: No edema noted    Vision/Perceptual:       Acuity: Within Defined Limits      Coordination:  Coordination: Within functional limits (BUEs)       Balance:  Sitting: Intact  Standing: Intact; Without support    Strength:  Strength: Within functional limits (BUEs 5/5, except for R hand 3+/5)     Tone & Sensation:  Tone: Normal (BUEs)  Sensation: Intact (BUEs)localization to touch     Range of Motion:  AROM: Within functional limits (BUEs)    Functional Mobility and Transfers for ADLs:  Bed Mobility:    Patient in chair upon arrival  Scooting: Independent  Transfers:  Sit to Stand: Independent    Toilet Transfer : Independent (simulated with no AD)       ADL Assessment:(clinical judgement)  Feeding: Modified independent; Adaptive equipment    Oral Facial Hygiene/Grooming: Modified Independent    Bathing: Independent    Upper Body Dressing: Independent    Lower Body Dressing: Independent    Toileting: Independent     Pain:  Pt reports 0/10 pain or discomfort prior to treatment.    Pt reports 0/10 pain or discomfort post treatment. Activity Tolerance:  Good    Please refer to the flowsheet for vital signs taken during this treatment. After treatment:   [x]  Patient left in no apparent distress sitting up in chair  []  Patient left in no apparent distress in bed  [x]  Call bell left within reach  []  Nursing notified  []  Caregiver present  []  Bed alarm activated    COMMUNICATION/EDUCATION:   Communication/Collaboration:  []      Home safety education was provided and the patient/caregiver indicated understanding. [x]      Patient/family have participated as able and agree with findings and recommendations. []      Patient is unable to participate in plan of care at this time.     Prince Kurt OTR/L  Time Calculation: 26 mins

## 2017-10-24 NOTE — ROUTINE PROCESS
Primary Nurse Hannah Johnson RN and SANDRA Hogue RN performed a dual skin assessment on this patient No impairment noted  Jordy score is 23

## 2017-10-24 NOTE — CONSULTS
[address]    Name:  Bia Gonzalez  MR#:  [id number]  :  1940  Account #:  [de-identified]  Date of Adm:  10/23/2017  Date of Consultation:  10/24/2017      REASON FOR CONSULTATION REQUEST: Evaluate TIA/stroke. HISTORY OF PRESENT ILLNESS: This patient who presents with an  abrupt onset of neurologic dysfunction actually had a stuttering course  starting over the weekend with hands paresis and numbness, which  then evolved and caused him to present. He actually had it for a few  days and saw another healthcare provider which recommended him  being on aspirin and then ultimately consulted by his son to come to  the emergency room. He reports that symptomatology is limited to the  hand. It is clumsiness of the hand, decreased  strength and then  numbness/paresthesia. He reports it is improved, but not resolved. Risk factors for stroke include diabetes and hypertension. He has also  had knee replacement surgery in the past. Otherwise, healthy. MEDICATIONS: He is on:  1. Metformin. 2. Lisinopril. 3. Hydrochlorothiazide. 4. Zetia. 5. Glucovance - in the outpatient setting. REVIEW OF SYSTEMS: He has no other complaints to a 10-  system review of systems. PHYSICAL EXAMINATION  VITAL SIGNS: Blood pressure 126/79. Nontachypneic. Pulse 71. The  patient without fever. NEUROLOGIC: He is awake and alert. Cranial nerves 2 to 12 done in  detail and are normal. Noted that he tends to talk in a thick tongue lisp  type of manner but he states that this is since childhood and part of his  underlying speech pattern and not new pathology. A subtle right drift. Clumsiness to finger tapping on the right. Strength seems intact. Coordination is fine. Reflexes symmetric and nonpathologic. Sensory  exam is intact to primary modalities, except for diminished mildly  distally. Cortical modalities are intact. Gait is normal. Tinel's and  Phalen's was negative. No hand atrophy. Wrist extension is intact. No  evidence of radial nerve palsy or median nerve palsy. STUDIES: Head CT report reviewed. Radiologist concerned  about findings consistent and concerning for acute lacunar stroke in  the right mary. EKG shows sinus rhythm. Carotid duplex scan is  unrevealing with antegrade vertebral flow and no significant carotid  stenosis. ASSESSMENT: Patient with neurologic symptoms as described  above, consistent with stroke. I suspect this patient has a clumsy  handed dysarthria-like presentation. Could be pontine. More likely  subcortical  area on the left. I do not believe this  is a peripheral nerve process. Await MRI. Treat hemoglobin A1c to a  target of 7. Evaluate cholesterol, treat LDL to 70. Physical activity. Treat blood pressure to JNC standard. No further recommendations at  this time. It was a pleasure seeing the patient. PT, OT and Speech, as  well as stroke orders, if not already ordered should be done. MD Britta Pillai / Alberto Augustin  D:  10/24/2017   11:41  T:  10/24/2017   12:49  Job #:  [id number]

## 2017-10-24 NOTE — ROUTINE PROCESS
Bedside, Verbal and Written shift change report given to DANNA Bee RN (oncoming nurse) by Sheba SCHAEFER(offgoing nurse). Report included the following information SBAR, Kardex, and MAR. Hourly rounding and  chart checks completed.

## 2017-10-24 NOTE — ROUTINE PROCESS
TRANSFER - IN REPORT:    Verbal report received from DANNA Pierce RN(name) on Onnie Notch  being received from [facility] ED(unit) for routine progression of care      Report consisted of patients Situation, Background, Assessment and   Recommendations(SBAR). Information from the following report(s) SBAR, Kardex, MAR, Recent Results and Cardiac Rhythm NSR was reviewed with the receiving nurse. Opportunity for questions and clarification was provided. Assessment completed upon patients arrival to unit and care assumed.

## 2017-10-24 NOTE — H&P
HISTORY & PHYSICAL            Patient: Yobani Beebe MRN: [id number]  CSN: [id number]    YOB: 1940  Age: 69 y.o. Sex: male    DOA: 10/23/2017 LOS:  LOS: 1 day        DOA: 10/23/2017        Assessment/Plan     Active Problems:    Right hand weakness (10/23/2017)      CVA (cerebral vascular accident) ([facility] [address] (10/24/2017)        Plan:  1. Acute CVA with R hand numbness  - ASA , zetia , MRI & MRA Brain in AM, Neuro consult   2. H/o HTN - continue home meds   3. H/o T2DM - continue glyburide - monitor BS , hold metformin   4. H/o HLPD - continue zetia   DVT Px - Heparin   Full Code                 HPI:     Yobani Beebe is a 69 y.o. male who is being admitted to the hosp 2y to R hand numbness -- mentions he has been feeling numbness in his R hand for the past 2 days which got worse & he noticed that he was unable to hold a pen. Says his strength is good , no weakness in arms or legs , he is R handed , no slurred speech , no difficulty swallowing. ER eval noted - CT head showed that pt has had an acute CVA  He mentions that he is usually in good health & takes his meds regularly. Pt is being admitted ro further eval of CVA     Past Medical History:   Diagnosis Date    Diabetes ([facility] [address]     Hypertension        Past Surgical History:   Procedure Laterality Date    HX KNEE REPLACEMENT Right     HX ORTHOPAEDIC      rt knee twice       History reviewed. No pertinent family history. Social History     Social History    Marital status:      Spouse name: N/A    Number of children: N/A    Years of education: N/A     Social History Main Topics    Smoking status: Former Smoker    Smokeless tobacco: Former User    Alcohol use No    Drug use: No    Sexual activity: Not Asked     Other Topics Concern    None     Social History Narrative       Prior to Admission medications    Medication Sig Start Date End Date Taking?  Authorizing Provider   metFORMIN (GLUCOPHAGE) 500 mg tablet Take  by mouth two (2) times daily (with meals). Yes Historical Provider   lisinopril-hydrochlorothiazide (PRINZIDE, ZESTORETIC) 20-12.5 mg per tablet Take  by mouth daily. Yes Melecio Rasmussen MD   ezetimibe (ZETIA) 10 mg tablet Take  by mouth. Yes Melecio Rasmussen MD   glyBURIDE-metFORMIN (GLUCOVANCE) 5-500 mg per tablet Take 1 Tab by mouth Daily (before breakfast). Yes Melecio Rasmussen MD       No Known Allergies    Review of Systems  A comprehensive review of systems was negative except for that written in the History of Present Illness. Physical Exam:      Visit Vitals    /75 (BP 1 Location: Left arm)    Pulse 74    Temp 97.9 °F (36.6 °C)    Resp 17    Ht 5' 6\" (1.676 m)    Wt 88.3 kg (194 lb 9.6 oz)    SpO2 96%    BMI 31.41 kg/m2       Physical Exam:    Gen: In general, this is a well nourished male in no acute distress  HEENT: Sclerae nonicteric. Oral mucous membranes moist. Dentition wnl  Neck: Supple with midline trachea. CV: RRR without murmur or rub appreciated. Resp:Respirations are unlabored without use of accessory muscles. Lung fields bilaterally without wheezes or rhonchi. Abd: Soft, nontender, nondistended. Extrem: Extremities are warm, without cyanosis or clubbing. No pitting pretibial edema. Palpable distal pulses X 4.   Skin: Warm, no visible rashes. Neuro: Patient is alert, oriented, and cooperative. No obvious focal defects. Moves all 4 extremities. No difference in strength between R & L hands     Labs Reviewed:    Recent Results (from the past 24 hour(s))   CBC WITH AUTOMATED DIFF    Collection Time: 10/23/17  7:15 PM   Result Value Ref Range    WBC 7.3 4.6 - 13.2 K/uL    RBC 4.25 (L) 4.70 - 5.50 M/uL    HGB 13.8 13.0 - 16.0 g/dL    HCT 39.6 36.0 - 48.0 %    MCV 93.2 74.0 - 97.0 FL    MCH 32.5 24.0 - 34.0 PG    MCHC 34.8 31.0 - 37.0 g/dL    RDW 12.3 11.6 - 14.5 %    PLATELET 037 847 - 697 K/uL    MPV 10.1 9.2 - 11.8 FL    NEUTROPHILS 53 40 - 73 %    LYMPHOCYTES 31 21 - 52 % MONOCYTES 11 (H) 3 - 10 %    EOSINOPHILS 4 0 - 5 %    BASOPHILS 1 0 - 2 %    ABS. NEUTROPHILS 3.9 1.8 - 8.0 K/UL    ABS. LYMPHOCYTES 2.3 0.9 - 3.6 K/UL    ABS. MONOCYTES 0.8 0.05 - 1.2 K/UL    ABS. EOSINOPHILS 0.3 0.0 - 0.4 K/UL    ABS.  BASOPHILS 0.0 0.0 - 0.06 K/UL    DF AUTOMATED     METABOLIC PANEL, BASIC    Collection Time: 10/23/17  7:15 PM   Result Value Ref Range    Sodium 137 136 - 145 mmol/L    Potassium 4.2 3.5 - 5.5 mmol/L    Chloride 101 100 - 108 mmol/L    CO2 29 21 - 32 mmol/L    Anion gap 7 3.0 - 18 mmol/L    Glucose 194 (H) 74 - 99 mg/dL    BUN 26 (H) 7.0 - 18 MG/DL    Creatinine 1.32 (H) 0.6 - 1.3 MG/DL    BUN/Creatinine ratio 20 12 - 20      GFR est AA >60 >60 ml/min/1.73m2    GFR est non-AA 53 (L) >60 ml/min/1.73m2    Calcium 9.1 8.5 - 10.1 MG/DL   CARDIAC PANEL,(CK, CKMB & TROPONIN)    Collection Time: 10/23/17  7:15 PM   Result Value Ref Range     39 - 308 U/L    CK - MB 2.3 <3.6 ng/ml    CK-MB Index 1.5 0.0 - 4.0 %    Troponin-I, Qt. <0.02 0.00 - 0.06 NG/ML   PROTHROMBIN TIME + INR    Collection Time: 10/23/17  7:15 PM   Result Value Ref Range    Prothrombin time 12.1 11.5 - 15.2 sec    INR 0.9 0.8 - 1.2     PTT    Collection Time: 10/23/17  7:15 PM   Result Value Ref Range    aPTT 33.0 23.0 - 36.4 SEC   EKG, 12 LEAD, INITIAL    Collection Time: 10/23/17  7:24 PM   Result Value Ref Range    Ventricular Rate 86 BPM    Atrial Rate 86 BPM    P-R Interval 180 ms    QRS Duration 90 ms    Q-T Interval 366 ms    QTC Calculation (Bezet) 437 ms    Calculated P Axis 45 degrees    Calculated R Axis 0 degrees    Calculated T Axis 45 degrees    Diagnosis       Normal sinus rhythm  Normal ECG  When compared with ECG of 21-AUG-2013 09:59,  No significant change was found     GLUCOSE, POC    Collection Time: 10/23/17 11:38 PM   Result Value Ref Range    Glucose (POC) 121 (H) 70 - 110 mg/dL       Imaging Reviewed:  CT head -   No evidence of intracranial hemorrhages.     Finding suspicious for acute lacunar infarction in right side of mid mary.     MRI of brain is suggested for further evaluation.         Ki Calderón MD  10/24/2017, 12:15 AM

## 2017-10-24 NOTE — PROCEDURES
[facility]  *** FINAL REPORT ***    Name: Salvador Chau  MRN: [id number]    Inpatient  : 07 Aug 1940  HIS Order #: [id number]  [address] [facility] Visit #: [id number]  Date: 24 Oct 2017    TYPE OF TEST: Cerebrovascular Duplex    REASON FOR TEST  Cerebrovascular accident, Hypercholesterolemia, Hypertension,  unspecified    Right Carotid:-             Proximal               Mid                 Distal  cm/s  Systolic  Diastolic  Systolic  Diastolic  Systolic  Diastolic  CCA:     20.9      28.0       88.0      16.0       74.0      24.0  Bulb:  ECA:     75.0       8.0  ICA:     78.0      26.0       76.0      29.0       72.0      21.0  ICA/CCA:  0.8       0.9    ICA Stenosis: <50%    Right Vertebral:-  Finding: Antegrade  Sys:       41.0  Danya:       18.0    Right Subclavian: Normal    Left Carotid:-            Proximal                Mid                 Distal  cm/s  Systolic  Diastolic  Systolic  Diastolic  Systolic  Diastolic  CCA:     97.9      15.0       89.0      26.0       87.0      27.0  Bulb:  ECA:    122.0      19.0  ICA:     58.0      19.0       82.0      33.0       98.0      42.0  ICA/CCA:  1.1       1.6    ICA Stenosis: <50%    Left Vertebral:-  Finding: Antegrade  Sys:       66.0  Danya:       26.0    Left Subclavian: Normal    INTERPRETATION/FINDINGS  Duplex images were obtained using 2-D gray scale, color flow, and  spectral Doppler analysis. 1. Bilateral <50% stenosis of the internal carotid arteries. 2. No significant stenosis in the external carotid arteries  bilaterally. 3. Antegrade flow in both vertebral arteries. 4. Normal flow in both subclavian arteries. Plaque Morphology:  1. Calcified plaque in the bulb and right ICA. 2. Heterogeneous plaque in the bulb and left ICA. ADDITIONAL COMMENTS  No previous study available for comparison. I have personally reviewed the data relevant to the interpretation of  this  study.     TECHNOLOGIST: TEGAN Partida RVS  Signed: 10/24/2017 09:06 AM    PHYSICIAN: Debbie Eason MD  Signed: 10/25/2017 12:18 PM

## 2017-10-24 NOTE — PROGRESS NOTES
Problem: Mobility Impaired (Adult and Pediatric)  Goal: *Acute Goals and Plan of Care (Insert Text)  Pt presents independent with functional mobility at this time. No PT goals to be achieved. Pt d/c'd from PT.   physical Therapy EVALUATION & Discharge    Time In: 1200  Time Out: 9870    Patient: Jaun Lesches (63 y.o. male)  Date: 10/24/2017  Primary Diagnosis: Right hand weakness  CVA (cerebral vascular accident) [facility])    Precautions: Fall Risk Precautions    ASSESSMENT AND RECOMMENDATIONS:  Based on the objective data described below, the patient presents at baseline functional mobility. Pt's current impairments are in R hand,  strength and coordination and may be addressed by outpatient hand therapy (either PT or OT). Skilled physical therapy is not indicated at this time. Discharge Recommendations: Outpatient hand therapy  Further Equipment Recommendations for Discharge: N/A      G-CODES:     Mobility  Current  CH= 0%   Goal  CH= 0%  D/C  CH= 0%. The severity rating is based on the Level of Assistance required for Functional Mobility and ADLs. SUBJECTIVE:   Patient stated I don't think so, when PT inquired if he felt like he was having any difficulty with mobility aside from report of decreased R hand coordination and difficulty writing. Pt notes that he does not feel any change in B LE strength or balance at this time and only comments that, \"well, I can feel my knee,\" re: R knee with descending stairs stating this occurs at baseline following his knee replacements.     OBJECTIVE DATA SUMMARY:     Past Medical History:   Diagnosis Date    Diabetes ([facility] [address]     Hypertension      Past Surgical History:   Procedure Laterality Date    HX KNEE REPLACEMENT Right     HX ORTHOPAEDIC      rt knee twice     Barriers to Learning/Limitations: None  Compensate with: visual, verbal, tactile, kinesthetic cues/model  Prior Level of Function/Home Situation: Pt reports he was previously functionally independent and had two prior right total knee arthroplasties. Home Situation  Home Environment: Private residence  # Steps to Enter: 4  One/Two Story Residence: Two story  # of Interior Steps: 13  Interior Rails: Right  Lift Chair Available: No  Living Alone: No  Support Systems: Spouse/Significant Other/Partner, Child(nikki)  Patient Expects to be Discharged to[de-identified] Private residence  Current DME Used/Available at Home: None  Critical Behavior:  Pt is alert and oriented x 4 noting to PT \"they told me you were coming,\" at start of treatment session. Pt is pleasant and cooperative and decision making is appropriate. Strength:    Strength: Within functional limits (B LEs)  Tone & Sensation:   Tone: Normal (B LEs)  Sensation: Intact  Range Of Motion:  AROM: Within functional limits (B LEs)  PROM: Within functional limits (B LEs)  Functional Mobility:  Bed Mobility:  NT - pt up in chair when PT entered room. Pt denies difficulty with bed mobility and declines assessment. Transfers:  Sit to Stand: Independent  Stand to Sit: Independent  Balance:   Sitting: Intact  Standing: Intact  Ambulation/Gait Training:  Ambulation - Level of Assistance: Independent  Gait Description (WDL): Within defined limits    Pain:  Pain Scale 1: Numeric (0 - 10)  Pain Intensity 1: 0    Activity Tolerance:   Good    Please refer to the flowsheet for vital signs taken during this treatment. After treatment:   [x]         Patient left in no apparent distress sitting up in chair  []         Patient left in no apparent distress in bed  []         Call bell left within reach  []         Nursing notified  []         Caregiver present  []         Bed alarm activated    COMMUNICATION/EDUCATION:   [x]         Fall prevention education was provided and the patient/caregiver indicated understanding. [x]         Patient/family have participated as able in goal setting and plan of care.   []         Patient/family agree to work toward stated goals and plan of care. []         Patient understands intent and goals of therapy, but is neutral about his/her participation. []         Patient is unable to participate in goal setting and plan of care.     Thank you for this referral.  Lg  PT, DPT   Time Calculation: 15 mins

## 2017-10-24 NOTE — ED NOTES
Patient resting in bed. NO s/sx of distress noted. Patient aware of impending admission. No s/sx of distress noted.

## 2017-10-24 NOTE — PROGRESS NOTES
ARU/IPR REFERRAL CONTACT NOTE  [address] [place] [address] for Physical Rehabilitation    RE:   Emelia Marx     Thank you for the opportunity to review this patient's case for admission to [address] [place] [address] for Physical Rehabilitation. Based on our pre-admission screening:     [x ] This patient does not meet criteria for admission to [facility] for Physical Rehabilitation due to:    [x ] Too functional, per documentation, patient does not require both Physical and Occupational Therapy Services at an acute rehabilitation level of intensity. [x ] We recommend the following:  [ ] Re-evaluation of this patient's status when appropriate  [ ] Home with Home Care Services  [x ] Outpatient Therapy Services  [ ] Skilled Nursing Facility/Sub Acute Services with extended stay option  [ ] Assisted Living/ Adult Home    Again, Thank you for this referral. Should you have any questions please do not hesitate to call. Sincerely,  Dwaine Sol. Trevor Mcgowan, [address]  Trevor Mcgowan, RN  Admissions [facility] for Physical Rehabilitation  [phone]

## 2017-10-24 NOTE — PROGRESS NOTES
conducted an Initial consultation and Spiritual Assessment for Yobani Beebe, who is a 69 y.o.,male. Patients Primary Language is: [place]. According to the patients EMR Islam Affiliation is: Congregational. The reason the Patient came to the hospital is:   Patient Active Problem List    Diagnosis Date Noted    CVA (cerebral vascular accident) ([facility] [address] 10/24/2017    Right hand weakness 10/23/2017        The  provided the following Interventions:  Initiated a relationship of care and support. Patient is pleasant and jovial. He confirmed his Sabianist affiliation as Congregational. Listened empathically to patient's medical narrative. He said it seems to be mostly resolved, but he awaits further tests. Offered assurance of continued prayer on patient's behalf. Patient received Laban Dove from [place] [place]. He also received a Spiritual Health Kit from [place]. Chart reviewed. The following outcomes were achieved:  Patient processed feeling about current hospitalization. Patient expressed gratitude for the 's visit. Assessment:  Patient does not have any Sabianist or cultural needs that will affect patients preferences in health care. Patient did not indicate any other spiritual or Sabianist issues which require Spiritual Care Services interventions at this time. Plan:  Chaplains will continue to follow and will provide pastoral care as needed or requested. The Rev.  [facility], [address] [place] [address] 1330 Merged with [facility] 159  [facility] [phone] / [facility] [phone]

## 2017-10-24 NOTE — PROGRESS NOTES
Problem: Dysphagia (Adult)  Goal: *Acute Goals and Plan of Care (Insert Text)  Patient will:  1. Tolerate PO trials with 0 s/s overt distress in 4/5 trials  2. Utilize compensatory swallow strategies/maneuvers (decrease bite/sip, size/rate, alt. liq/sol) with min cues in 4/5 trials    Rec:     Reg solid diet with thin liquids  Aspiration precautions  HOB >45 during po intake, remain >30 for 30-45 minutes after po   Small bites/sips; alternate liquid/solid with slow feeding rate   Oral care TID  Meds per pt preference    Speech LAnguage Pathology bedside swallow evaluation    Patient: Chauncey Dumont (33 y.o. male)  Date: 10/24/2017  Primary Diagnosis: Right hand weakness  CVA (cerebral vascular accident) [facility])        Precautions: aspiration       ASSESSMENT :  Based on the objective data described below, the patient presents with oropharyngeal swallow fxn essentially WFL. Strength, ROM, and coordination of the orofacial musculature were all found to be [facility]. All structures were intact and symmetrical. The pt presented with adequate oral transit times on all consistencies. Mastication time was appropriate. No s/sx of aspiration noted; hyolaryngeal elevation and excursion appeared adequate on all consistencies. No oral stasis noted post swallow. The pt denied globus sensation post swallow. Speech production was fluent. No dysarthria was observed. Expressive/receptive speech production appeared WFL to informal observation. Pt communicated in sentences of appropriate form, content, and use. Voice production appeared [facility] for pt's age and gender. No evidence of vocal pathology appreciated at this time. ST will continue to follow x 1-2 visits to ensure safety of diet tolerance. Patient will benefit from skilled intervention to address the above impairments.   Patients rehabilitation potential is considered to be Good  Factors which may influence rehabilitation potential include:   [x]            None noted     PLAN :  Recommendations and Planned Interventions: See above  Frequency/Duration: Patient will be followed by speech-language pathology x 1-2 more visits to address goals. Discharge Recommendations: Home Health, Outpatient and To Be Determined     SUBJECTIVE:   Patient stated I'm a little worried about my hand and my ability to write. OBJECTIVE:     Past Medical History:   Diagnosis Date    Diabetes ([address]     Hypertension      Past Surgical History:   Procedure Laterality Date    HX KNEE REPLACEMENT Right     HX ORTHOPAEDIC      rt knee twice     Prior Level of Function/Home Situation: private residence  Home Situation  Home Environment: Private residence  # Steps to Enter: 4  One/Two Story Residence: Two story  # of Interior Steps: 13  Interior Rails: Right  Lift Chair Available: No  Living Alone: No  Support Systems: Spouse/Significant Other/Partner, Child(nikki)  Patient Expects to be Discharged to[de-identified] Private residence  Current DME Used/Available at Home: None  Diet prior to admission: reg with thin  Current Diet:  Reg with thin   Cognitive and Communication Status:  Neurologic State: Alert  Orientation Level: Oriented X4  Cognition: Follows commands   Oral Assessment:  Oral Assessment  Labial: No impairment  Dentition: Natural;Intact  Oral Hygiene: Adequate  Lingual: No impairment  Velum: No impairment  Mandible: No impairment  P.O. Trials:  Patient Position: 55 at [facility]  Vocal quality prior to P.O.: No impairment  Consistency Presented: Thin liquid; Solid;Puree  How Presented: Self-fed/presented;Cup/sip;Spoon;Straw  Bolus Acceptance: No impairment  Bolus Formation/Control: No impairment  Propulsion: No impairment  Oral Residue: None  Initiation of Swallow: No impairment  Laryngeal Elevation: Functional  Aspiration Signs/Symptoms: None  Pharyngeal Phase Characteristics: No impairment, issues, or problems   Effective Modifications: None  Cues for Modifications: None     Oral Phase Severity: No impairment  Pharyngeal Phase Severity : No impairment    GCODESwallowing:  Swallow Current Status CI= 1-19%   Swallow Goal Status CH= 0%    The severity rating is based on the following outcomes:    Clinical Judgment    Pain:  Pt c/o 0/10 pain prior to evaluation. Pt c/o 0/10 pain post evaluation. After treatment:   []            Patient left in no apparent distress sitting up in chair  [x]            Patient left in no apparent distress in bed  [x]            Call bell left within reach  [x]            Nursing notified  []            Caregiver present  []            Bed alarm activated    COMMUNICATION/EDUCATION:   [x]            SLP educated pt with regard to compensatory swallow strategies and       aspiration/reflux precautions including: small bites/sips,       alternate liquids/solids, decrease feeding rate, HOB > 45 with all po, and         upright in bed at 30 degrees after po for at least 45 minutes. [x]            Patient/family have participated as able in goal setting and plan of care.     Thank you for this referral.    Ana Reed M.S. CCC-SLP/L  Speech-Language Pathologist

## 2017-10-25 VITALS
SYSTOLIC BLOOD PRESSURE: 114 MMHG | TEMPERATURE: 97.7 F | DIASTOLIC BLOOD PRESSURE: 77 MMHG | WEIGHT: 190.1 LBS | OXYGEN SATURATION: 97 % | BODY MASS INDEX: 30.55 KG/M2 | HEART RATE: 71 BPM | HEIGHT: 66 IN | RESPIRATION RATE: 16 BRPM

## 2017-10-25 LAB
CHOLEST SERPL-MCNC: 179 MG/DL
EST. AVERAGE GLUCOSE BLD GHB EST-MCNC: 140 MG/DL
GLUCOSE BLD STRIP.AUTO-MCNC: 103 MG/DL (ref 70–110)
GLUCOSE BLD STRIP.AUTO-MCNC: 143 MG/DL (ref 70–110)
HBA1C MFR BLD: 6.5 % (ref 4.2–5.6)
HDLC SERPL-MCNC: 30 MG/DL (ref 40–60)
HDLC SERPL: 6 {RATIO} (ref 0–5)
LDLC SERPL CALC-MCNC: ABNORMAL MG/DL (ref 0–100)
LIPID PROFILE,FLP: ABNORMAL
TRIGL SERPL-MCNC: 447 MG/DL (ref ?–150)
VLDLC SERPL CALC-MCNC: ABNORMAL MG/DL

## 2017-10-25 PROCEDURE — 74011000250 HC RX REV CODE- 250: Performed by: HOSPITALIST

## 2017-10-25 PROCEDURE — 74011250637 HC RX REV CODE- 250/637: Performed by: HOSPITALIST

## 2017-10-25 PROCEDURE — 93306 TTE W/DOPPLER COMPLETE: CPT

## 2017-10-25 PROCEDURE — 74011250636 HC RX REV CODE- 250/636: Performed by: HOSPITALIST

## 2017-10-25 PROCEDURE — 74011250637 HC RX REV CODE- 250/637: Performed by: INTERNAL MEDICINE

## 2017-10-25 PROCEDURE — 82962 GLUCOSE BLOOD TEST: CPT

## 2017-10-25 PROCEDURE — 36415 COLL VENOUS BLD VENIPUNCTURE: CPT | Performed by: HOSPITALIST

## 2017-10-25 PROCEDURE — 92526 ORAL FUNCTION THERAPY: CPT

## 2017-10-25 PROCEDURE — 80061 LIPID PANEL: CPT | Performed by: HOSPITALIST

## 2017-10-25 PROCEDURE — 83036 HEMOGLOBIN GLYCOSYLATED A1C: CPT | Performed by: HOSPITALIST

## 2017-10-25 RX ORDER — CLOPIDOGREL BISULFATE 75 MG/1
75 TABLET ORAL DAILY
Qty: 90 TAB | Refills: 0 | Status: SHIPPED | OUTPATIENT
Start: 2017-10-26

## 2017-10-25 RX ORDER — ASPIRIN 81 MG/1
81 TABLET ORAL DAILY
Qty: 90 TAB | Refills: 0 | Status: SHIPPED | OUTPATIENT
Start: 2017-10-25

## 2017-10-25 RX ORDER — ASPIRIN 325 MG
325 TABLET ORAL DAILY
Qty: 30 TAB | Refills: 0 | Status: SHIPPED | OUTPATIENT
Start: 2017-10-26 | End: 2017-10-25

## 2017-10-25 RX ORDER — SODIUM CHLORIDE 9 MG/ML
10 INJECTION INTRAMUSCULAR; INTRAVENOUS; SUBCUTANEOUS
Status: COMPLETED | OUTPATIENT
Start: 2017-10-25 | End: 2017-10-25

## 2017-10-25 RX ORDER — ATORVASTATIN CALCIUM 40 MG/1
40 TABLET, FILM COATED ORAL DAILY
Qty: 30 TAB | Refills: 0 | Status: SHIPPED | OUTPATIENT
Start: 2017-10-25

## 2017-10-25 RX ADMIN — CLOPIDOGREL BISULFATE 75 MG: 75 TABLET ORAL at 09:04

## 2017-10-25 RX ADMIN — LISINOPRIL AND HYDROCHLOROTHIAZIDE 1 TABLET: 12.5; 2 TABLET ORAL at 09:04

## 2017-10-25 RX ADMIN — Medication 10 ML: at 16:02

## 2017-10-25 RX ADMIN — ASPIRIN 325 MG ORAL TABLET 325 MG: 325 PILL ORAL at 09:04

## 2017-10-25 RX ADMIN — HEPARIN SODIUM 5000 UNITS: 5000 INJECTION, SOLUTION INTRAVENOUS; SUBCUTANEOUS at 09:04

## 2017-10-25 RX ADMIN — EZETIMIBE 10 MG: 10 TABLET ORAL at 09:04

## 2017-10-25 RX ADMIN — GLYBURIDE 5 MG: 2.5 TABLET ORAL at 09:04

## 2017-10-25 RX ADMIN — Medication 10 ML: at 06:45

## 2017-10-25 RX ADMIN — HEPARIN SODIUM 5000 UNITS: 5000 INJECTION, SOLUTION INTRAVENOUS; SUBCUTANEOUS at 02:20

## 2017-10-25 RX ADMIN — SODIUM CHLORIDE 10 ML: 9 INJECTION INTRAMUSCULAR; INTRAVENOUS; SUBCUTANEOUS at 10:14

## 2017-10-25 RX ADMIN — Medication 10 ML: at 06:46

## 2017-10-25 NOTE — PROGRESS NOTES
Problem: Dysphagia (Adult)  Goal: *Acute Goals and Plan of Care (Insert Text)  Patient will:  1. Tolerate PO trials with 0 s/s overt distress in 4/5 trials - met  2. Utilize compensatory swallow strategies/maneuvers (decrease bite/sip, size/rate, alt. liq/sol) with min cues in 4/5 trials - met    Rec:     Reg solid diet with thin liquids  Aspiration precautions  HOB >45 during po intake, remain >30 for 30-45 minutes after po   Small bites/sips; alternate liquid/solid with slow feeding rate   Oral care TID  Meds per pt preference     Outcome: Resolved/Met Date Met: 10/25/17  Speech language pathology dysphagia treatment/discharge    Patient: Kari Do (89 y.o. male)  Date: 10/25/2017  Diagnosis: Right hand weakness  CVA (cerebral vascular accident) ([facility] [address] <principal problem not specified>       Precautions: n/a  Fall    ASSESSMENT:  Pt was seen at bedside for f/u dysphagia management. Pt tolerating reg solid and thin liquids without any overt s/sx of aspiration and positive oral clearance. Laryngeal elevation was timely and functional to palpation. No dysarthria, receptive language def, or expressive language def noted. Rec reg solid diet with thin liquids, aspiration precautions, oral care TID, and meds as tolerated. No further skilled SLP services are indicated at this time. Please re-consult if needed. PLAN:  Recommendations and Planned Interventions: See above  Discharge Recommendations:  Outpatient and To Be Determined     SUBJECTIVE:   Patient stated Larance Dash you for making sure I'm doing alright. OBJECTIVE:   Cognitive and Communication Status:  Neurologic State: Alert  Orientation Level: Oriented X4  Cognition: Follows commands      Dysphagia Treatment:  Oral Assessment:  Oral Assessment  Labial: No impairment  Dentition: Natural, Intact  Oral Hygiene: Adequate  Lingual: No impairment  Velum: No impairment  Mandible: No impairment  P.O.  Trials:   Patient Position: 55 at [facility]   Vocal quality prior to P.O.: No impairment   Consistency Presented: Thin liquid, Solid, Puree   How Presented: Self-fed/presented, Cup/sip, Spoon, Straw   Bolus Acceptance: No impairment   Bolus Formation/Control: No impairment   Propulsion: No impairment   Oral Residue: None   Initiation of Swallow: No impairment   Laryngeal Elevation: Functional   Aspiration Signs/Symptoms: None   Pharyngeal Phase Characteristics: No impairment, issues, or problems    Effective Modifications: None   Cues for Modifications: None       Oral Phase Severity: No impairment   Pharyngeal Phase Severity : No impairment     PAIN:  Pt reports 0/10 pain or discomfort prior to tx. Pt reports 0/10 pain or discomfort post tx. After treatment:   []              Patient left in no apparent distress sitting up in chair  [x]              Patient left in no apparent distress in bed  [x]              Call bell left within reach  [x]              Nursing notified  []              Caregiver present  []              Bed alarm activated      COMMUNICATION/EDUCATION:   [x]              SLP educated pt with regard to compensatory swallow strategies and        aspiration/reflux precautions including: small bites/sips,        alternate liquids/solids, decrease feeding rate, HOB > 45 with all po, and                   upright in bed at 30 degrees after po for at least 45 minutes.      Thank you for this referral.    Dajuan Foster M.S. CCC-SLP/L  Speech-Language Pathologist

## 2017-10-25 NOTE — PROGRESS NOTES
Completed Echocardiogram. Report to follow. Patient to be transported back to room.     Coco Sevilla, RCS, RDCS

## 2017-10-25 NOTE — ROUTINE PROCESS
Bedside shift change report given to [employer]   RN (oncoming nurse) by Marcio Keith RN (offgoing nurse). Report included the following information SBAR, ED Summary, MAR and Recent Results.

## 2017-10-25 NOTE — ROUTINE PROCESS
[facility] Stroke Education Booklet and Stroke Education provided to patient and the following topics were discussed    1. Patients personal risk factors for stroke are hypertension and hyperlipidemia    2. Warning signs of Stroke:        * Sudden numbness or weakness of the face, arm or leg, especially on one side of          The body            * Sudden confusion, trouble speaking or understanding        * Sudden trouble seeing in one or both eyes        * Sudden trouble walking, dizziness, loss of balance or coordination        * Sudden severe headache with no known cause      3. Importance of activation Emergency Medical Services ( 9-1-1 ) immediately if  you experience any warning signs of stroke. 4. Be sure and schedule a follow-up appointment with your primary care doctor or any specialists as instructed. 5. You must take medicine every day to treat your risk factors for stroke. Be sure to take your medicines exactly as your doctor tells you: no more, no less. Know what your medicines are for , what they do. Anti-thrombotics /anticoagulants can help prevent strokes. You are taking the following medicine(s)  Lovenox     6. Smoking and second-hand smoke greatly increase your risk of stroke, cardiovascular disease and death.  Smoking history ended year

## 2017-10-25 NOTE — PROGRESS NOTES
Admit Date: 10/23/2017  Date of Service: 10/25/2017    Reason for follow-up: CVA      Assessment:         Acute CVA with right hand weakness: m/l pontine CVA; MRA without evidence of hemorrhage or occlusion; Carotid US with b/l 50% stenosis (non-surgical); TTE with EF 60%, no valvular abnormalities  HTN:  Currently controlled  DM type 2: metformin held as inpatient; remains on glyburide; HBAc1 6.5  Dyslipidemia:  On Zetia    Plan:   Appreciate neurology input- discussed with today  Lipitor 40 mg po daily to replace Zetia  Continue ASA 325mg    Ok for d/c today. Discussed with Neurology. Pt has f/u with his PCP on Friday      Current Antibtiocs:   None    Lines:   periperhal    I have independently examined the patient and reviewed all lab studies and imgaing as well as review of nursing notes and physican notes from the past 24 hours. The plan of care has been discussed with the patient and all questions are answered. [facility]. [address] [facility]  Office [phone]  Fax [phone]      No Known Allergies        Subjective:      Pt seen and examined. Feeling ok today. Has been working with therapy on strength training for his right hand. Overall in good spirits. Has not noticed any new areas of weakness or numbness. No other questions.          Objective:        Visit Vitals    /86 (BP 1 Location: Left arm, BP Patient Position: At rest)    Pulse 73    Temp 98.2 °F (36.8 °C)    Resp 16    Ht 5' 6\" (1.676 m)    Wt 86.2 kg (190 lb 1.6 oz)    SpO2 96%    BMI 30.68 kg/m2     Temp (24hrs), Av.9 °F (36.6 °C), Min:97.6 °F (36.4 °C), Max:98.2 °F (36.8 °C)        General:   awake alert and oriented, non-toxic   Skin:   no rashes or skin lesions noted on limited exam, dry and warm   HEENT:  No scleral icterus or pallor; oral mucosa moist, lips moist   Lymph Nodes:   not assessed today   Lungs:   non, labored; bilaterally clear to aspiration- no crackles wheezes rales or rhonchi   Heart:  RRR, s1 and s2; no murmurs rubs or gallops; no edema, + pedal pulses   Abdomen:  soft, non-distended, active bowel sounds, non-tender   Genitourinary:  deferred   Extremities:   average muscle tone; no contractures, no joint effusions   Neurologic:  No gross focal sensory abnormalities; CN 2-12 intact; Follows commands. Ambulates with limp. 4+/5 strength in right hand , no obvious tremor   Psychiatric:   appropriate and interactive. Labs: Results:   Chemistry Recent Labs      10/23/17   1915   GLU  194*   NA  137   K  4.2   CL  101   CO2  29   BUN  26*   CREA  1.32*   CA  9.1   AGAP  7   BUCR  20      CBC w/Diff Recent Labs      10/23/17   1915   WBC  7.3   RBC  4.25*   HGB  13.8   HCT  39.6   PLT  263   GRANS  53   LYMPH  31   EOS  4        Lab Results   Component Value Date/Time    Specimen Description: KNEE RIGHT 09/11/2012 08:57 AM    Specimen Description: FLUID RIGHT KNEE 09/11/2012 08:57 AM    Specimen Description: FLUID RIGHT KNEE 09/11/2012 08:57 AM    Specimen Description: FLUID RIGHT KNEE 09/11/2012 08:57 AM    Specimen Description: ASPIRATE RIGHT KNEE 08/13/2012 05:00 PM    Specimen Description: ASPIRATE RIGHT KNEE 08/13/2012 05:00 PM    Lab Results   Component Value Date/Time    Culture result: NO GROWTH 5 DAYS 09/11/2012 08:57 AM    Culture result: NO ANAEROBES ISOLATED 5 DAYS 09/11/2012 08:57 AM    Culture result: NO FUNGUS ISOLATED 34 DAYS 09/11/2012 08:57 AM    Culture result:  09/11/2012 08:57 AM     NO AFB ISOLATED AT 8 WEEKS TEST PERFORMED AT [address] [place] [address] GEN. HOSP. LAB    Culture result: NO GROWTH 5 DAYS 08/13/2012 05:00 PM    Culture result: NO ANAEROBES ISOLATED 5 DAYS 08/13/2012 05:00 PM          Imaging:   10/24 MRA: Patent intracranial cerebral arteries.  No evidence of a proximal arterial  occlusion or critical stenosis.   Normal variant fenestrations of the A1 RACA and basilar artery    10/24 TTE: Left ventricle: Systolic function was normal by visual assessment. Ejection   fraction was estimated in the range of 55 %  to 60 %. No obvious wall motion abnormalities identified in the views   obtained. Wall thickness was mildly increased. Atrial septum: Contrast injection was performed. There was no right-to-left   shunt, with provocative maneuvers to  increase right atrial pressure. 10/23 CT head: No evidence of intracranial hemorrhages.   Finding suspicious for acute lacunar infarction in right side of mid mary.   MRI of brain is suggested for further evaluation. 10/24 carotid US: 1. Bilateral <50% stenosis of the internal carotid arteries. 2. No significant stenosis in the external carotid arteries  bilaterally. 3. Antegrade flow in both vertebral arteries. 4. Normal flow in both subclavian arteries. Plaque Morphology:  1. Calcified plaque in the bulb and right ICA. 2. Heterogeneous plaque in the bulb and left ICA.

## 2017-10-25 NOTE — DISCHARGE SUMMARY
Discharge Summary    Patient: Shirley Angelo MRN: [id number]  CSN: [id number]    YOB: 1940  Age: 69 y.o. Sex: male    DOA: 10/23/2017 LOS:  LOS: 2 days   Discharge Date:      Admission Diagnoses: Right hand weakness  CVA (cerebral vascular accident) [facility])    Discharge Diagnoses:    Problem List as of 10/25/2017  Date Reviewed: 1/4/2016          Codes Class Noted - Resolved    CVA (cerebral vascular accident) [facility]) ICD-10-CM: I63.9  ICD-9-CM: 434.91  10/24/2017 - Present        Right hand weakness ICD-10-CM: R29.898  ICD-9-CM: 728.87  10/23/2017 - Present              Discharge Condition: Stable    PHYSICAL EXAM  Visit Vitals    /77 (BP 1 Location: Left arm, BP Patient Position: Sitting)    Pulse 71    Temp 97.7 °F (36.5 °C)    Resp 16    Ht 5' 6\" (1.676 m)    Wt 86.2 kg (190 lb 1.6 oz)    SpO2 97%    BMI 30.68 kg/m2       General: Alert, cooperative, no acute distress    HEENT: NC, Atraumatic. PERRLA, EOMI. Anicteric sclerae. Lungs:  CTA Bilaterally. No Wheezing/Rhonchi/Rales. Heart:  Regular  rhythm,  No murmur, No Rubs, No Gallops  Abdomen: Soft, Non distended, Non tender.  +Bowel sounds, no HSM  Extremities: No c/c/e  Psych:   Good insight. Not anxious or agitated. Neurologic:  CN 2-12 grossly intact, oriented X 3. right hand with slight weakness compared to the left, decreased  strength compared to left. Hospital Course:   Shirley Angelo is a 69 y.o. male who is being admitted to the hospital due to R hand numbness -- mentions he has been feeling numbness in his R hand for 2 days prior to admission which got worse & he noticed that he was unable to hold a pen. Says his strength is good , no weakness in arms or legs , he is R handed , no slurred speech , no difficulty swallowing. ER eval noted - CT head showed that pt has had an acute CVA.  During this hospitalization, he was managed for the following problems:     Acute CVA with right hand weakness: m/l pontine CVA; MRA without evidence of hemorrhage or occlusion; Carotid US with b/l 50% stenosis (non-surgical); TTE with EF 60%, no valvular abnormalities   - plan for ASA 81 mg po daily + Plavix 75 mg po daily for 90 days, followed by ASA monotherapy. - continue lipitor 40 mg po daily  HTN:  Currently controlled  DM type 2: metformin held as inpatient; remains on glyburide; HBAc1 6.5  Dyslipidemia:  On Zetia at home; started on lipitor    Consults:   Neurology: Dr. Piña Lacks    Significant Diagnostic Studies:   10/23 CT head: No evidence of intracranial hemorrhages. Finding suspicious for acute lacunar infarction in right side of mid mary. 10/24 MRI/MRA brain:  1 cm acute cortical infarct at the left precentral frontal convexity immediately  below the hand knob. Very mild amount of chronic microvascular ischemic white matter lesions  Patent intracranial cerebral arteries. No evidence of a proximal arterial  occlusion or critical stenosis. Normal variant fenestrations of the A1 RACA and basilar artery. 1025 carotid US: 1. Bilateral <50% stenosis of the internal carotid arteries. 2. No significant stenosis in the external carotid arteries  bilaterally. 3. Antegrade flow in both vertebral arteries. 4. Normal flow in both subclavian arteries. Plaque Morphology:  1. Calcified plaque in the bulb and right ICA. 2. Heterogeneous plaque in the bulb and left ICA. 10/24 TTE:     Discharge Medications:     Current Discharge Medication List      START taking these medications    Details   aspirin (ASPIRIN) 81 mg tablet Take 1 Tab by mouth daily. Qty: 30 Tab, Refills: 0      Plavix 75 mg po daily x 90 days  Lipitor 40 mg po daily     CONTINUE these medications which have NOT CHANGED    Details   metFORMIN (GLUCOPHAGE) 500 mg tablet Take  by mouth two (2) times daily (with meals). lisinopril-hydrochlorothiazide (PRINZIDE, ZESTORETIC) 20-12.5 mg per tablet Take  by mouth daily.       glyBURIDE-metFORMIN (GLUCOVANCE) 5-500 mg per tablet Take 1 Tab by mouth Daily (before breakfast).          STOP taking these medications       ezetimibe (ZETIA) 10 mg tablet Comments:   Reason for Stopping:               Activity: activity as tolerated    Diet: Diabetic Diet    Wound Care: None needed    Follow-up: with PCP, Brenden Roberts MD in 7-10days    Minutes spent on discharge: >30 minutes spent coordinating this discharge (review instructions/follow-up, prescriptions, preparing report for sign off)

## 2017-10-25 NOTE — DISCHARGE INSTRUCTIONS
Patient armband removed and shredded    MyChart Activation    Thank you for requesting access to [facility]. Please follow the instructions below to securely access and download your online medical record. [facility] allows you to send messages to your doctor, view your test results, renew your prescriptions, schedule appointments, and more. How Do I Sign Up? 1. In your internet browser, go to [email or web address]  2. Click on the First Time User? Click Here link in the Sign In box. You will be redirect to the New Member Sign Up page. 3. Enter your [facility] Access Code exactly as it appears below. You will not need to use this code after youve completed the sign-up process. If you do not sign up before the expiration date, you must request a new code. [facility] Access Code: Activation code not generated  Current [facility] Status: Active (This is the date your [facility] access code will )    4. Enter the last four digits of your Social Security Number (xxxx) and Date of Birth (mm/dd/yyyy) as indicated and click Submit. You will be taken to the next sign-up page. 5. Create a [facility] ID. This will be your [facility] login ID and cannot be changed, so think of one that is secure and easy to remember. 6. Create a [facility] password. You can change your password at any time. 7. Enter your Password Reset Question and Answer. This can be used at a later time if you forget your password. 8. Enter your e-mail address. You will receive e-mail notification when new information is available in [address] 9. Click Sign Up. You can now view and download portions of your medical record. 10. Click the Download Summary menu link to download a portable copy of your medical information. Additional Information    If you have questions, please visit the Frequently Asked Questions section of the [facility] website at [email or web address] Remember, [facility] is NOT to be used for urgent needs.  For medical emergencies, dial 911. Stroke: Care Instructions  Your Care Instructions    You have had a stroke. This means that the blood flow to a part of your brain was blocked for some time, which damages the nerve cells in that part of the brain. The part of your body controlled by that part of your brain may not function properly now. The brain is an amazing organ that can heal itself to some degree. The stroke you had damaged part of your brain. But other parts of your brain may take over in some way for the damaged areas. You have already started this process. Your doctor will talk with you about what you can do to prevent another stroke. High blood pressure, high cholesterol, and diabetes are all risk factors for stroke. If you have any of these conditions, work with your doctor to make sure they are under control. Other risk factors for stroke include being overweight, smoking, and not getting regular exercise. Going home may be hard for you and your family. The more you can try to do for yourself, the better. Remember to take each day one at a time. Follow-up care is a key part of your treatment and safety. Be sure to make and go to all appointments, and call your doctor if you are having problems. It's also a good idea to know your test results and keep a list of the medicines you take. How can you care for yourself at home? · Enter a stroke rehabilitation (rehab) program, if your doctor recommends it. Physical, speech, and occupational therapies can help you manage bathing, dressing, eating, and other basics of daily living. · Do not drive until your doctor says it is okay. · It is normal to feel sad or depressed after a stroke. If these feelings last, talk to your doctor. · If you are having problems with urine leakage, go to the bathroom at regular times, including when you first wake up and at bedtime. Also, limit fluids after dinner.   · If you are constipated, drink plenty of fluids, enough so that your urine is light yellow or clear like water. If you have kidney, heart, or liver disease and have to limit fluids, talk with your doctor before you increase the amount of fluids you drink. Set up a regular time for using the toilet. If you continue to have constipation, your doctor may suggest using a bulking agent, such as Metamucil, or a stool softener, laxative, or enema. Medicines  · Take your medicines exactly as prescribed. Call your doctor if you think you are having a problem with your medicine. You may be taking several medicines. ACE (angiotensin-converting enzyme) inhibitors, angiotensin II receptor blockers (ARBs), beta-blockers, diuretics (water pills), and calcium channel blockers control your blood pressure. Statins help lower cholesterol. Your doctor may also prescribe medicines for depression, pain, sleep problems, anxiety, or agitation. · If your doctor has given you a blood thinner to prevent another stroke, be sure you get instructions about how to take your medicine safely. Blood thinners can cause serious bleeding problems. · Do not take any over-the-counter medicines or herbal products without talking to your doctor first.  · If you take birth control pills or hormone therapy, talk to your doctor about whether they are right for you. For family members and caregivers  · Make the home safe. Set up a room so that your loved one does not have to climb stairs. Be sure the bathroom is on the same floor. Move throw rugs and furniture that could cause falls. Make sure that the lighting is good. Put grab bars and seats in tubs and showers. · Find out what your loved one can do and what he or she needs help with. Try not to do things for your loved one that your loved one can do on his or her own. Help him or her learn and practice new skills. · Visit and talk with your loved one often. Try doing activities together that you both enjoy, such as playing cards or board games.  Keep in touch with your loved one's friends as much as you can. Encourage them to visit. · Take care of yourself. Do not try to do everything yourself. Ask other family members to help. Eat well, get enough rest, and take time to do things that you enjoy. Keep up with your own doctor visits, and make sure to take your medicines regularly. Get out of the house as much as you can. Join a local support group. Find out if you qualify for home health care visits to help with rehab or for adult day care. When should you call for help? Call 911 anytime you think you may need emergency care. For example, call if:  · You have signs of another stroke. These may include:  ¨ Sudden numbness, tingling, weakness, or loss of movement in your face, arm, or leg, especially on only one side of your body. ¨ Sudden vision changes. ¨ Sudden trouble speaking. ¨ Sudden confusion or trouble understanding simple statements. ¨ Sudden problems with walking or balance. ¨ A sudden, severe headache that is different from past headaches. Call 911 even if these symptoms go away in a few minutes. Call your doctor now or seek immediate medical care if:  · You have new symptoms that may be related to your stroke, such as falls or trouble swallowing. Watch closely for changes in your health, and be sure to contact your doctor if you have any problems. Where can you learn more? Go to http://mic-ezequiel.info/. Enter P866 in the search box to learn more about \"Stroke: Care Instructions. \"  Current as of: March 20, 2017  Content Version: 11.3  © 2644-5107 [employer]. Care instructions adapted under license by [employer] (which disclaims liability or warranty for this information). If you have questions about a medical condition or this instruction, always ask your healthcare professional. [place] [address] any warranty or liability for your use of this information.     DISCHARGE SUMMARY from Nurse    PATIENT INSTRUCTIONS:    After general anesthesia or intravenous sedation, for 24 hours or while taking prescription Narcotics:  · Limit your activities  · Do not drive and operate hazardous machinery  · Do not make important personal or business decisions  · Do  not drink alcoholic beverages  · If you have not urinated within 8 hours after discharge, please contact your surgeon on call. Report the following to your surgeon:  · Excessive pain, swelling, redness or odor of or around the surgical area  · Temperature over 100.5  · Nausea and vomiting lasting longer than 4 hours or if unable to take medications  · Any signs of decreased circulation or nerve impairment to extremity: change in color, persistent  numbness, tingling, coldness or increase pain  · Any questions    What to do at Home:  Recommended activity: Activity as tolerated,     If you experience any of the following symptoms blurred vision, headache, slurred speech, weakness in arms or legs, facial drooping, please follow up with 911. *  Please give a list of your current medications to your Primary Care Provider. *  Please update this list whenever your medications are discontinued, doses are      changed, or new medications (including over-the-counter products) are added. *  Please carry medication information at all times in case of emergency situations. These are general instructions for a healthy lifestyle:    No smoking/ No tobacco products/ Avoid exposure to second hand smoke  Surgeon General's Warning:  Quitting smoking now greatly reduces serious risk to your health.     Obesity, smoking, and sedentary lifestyle greatly increases your risk for illness    A healthy diet, regular physical exercise & weight monitoring are important for maintaining a healthy lifestyle    You may be retaining fluid if you have a history of heart failure or if you experience any of the following symptoms:  Weight gain of 3 pounds or more overnight or 5 pounds in a week, increased swelling in our hands or feet or shortness of breath while lying flat in bed. Please call your doctor as soon as you notice any of these symptoms; do not wait until your next office visit. Recognize signs and symptoms of STROKE:    F-face looks uneven    A-arms unable to move or move unevenly    S-speech slurred or non-existent    T-time-call 911 as soon as signs and symptoms begin-DO NOT go       Back to bed or wait to see if you get better-TIME IS BRAIN. Warning Signs of HEART ATTACK     Call 911 if you have these symptoms:   Chest discomfort. Most heart attacks involve discomfort in the center of the chest that lasts more than a few minutes, or that goes away and comes back. It can feel like uncomfortable pressure, squeezing, fullness, or pain.  Discomfort in other areas of the upper body. Symptoms can include pain or discomfort in one or both arms, the back, neck, jaw, or stomach.  Shortness of breath with or without chest discomfort.  Other signs may include breaking out in a cold sweat, nausea, or lightheadedness. Don't wait more than five minutes to call 911 - MINUTES MATTER! Fast action can save your life. Calling 911 is almost always the fastest way to get lifesaving treatment. Emergency Medical Services staff can begin treatment when they arrive -- up to an hour sooner than if someone gets to the hospital by car. The discharge information has been reviewed with the patient. The patient verbalized understanding. Discharge medications reviewed with the patient and appropriate educational materials and side effects teaching were provided.   ___________________________________________________________________________________________________________________________________

## 2018-05-12 ENCOUNTER — HOSPITAL ENCOUNTER (EMERGENCY)
Age: 78
Discharge: HOME | End: 2018-05-12
Attending: EMERGENCY MEDICINE
Payer: MEDICARE

## 2018-05-12 ENCOUNTER — HOSPITAL ENCOUNTER (OUTPATIENT)
Age: 78
Discharge: HOME | End: 2018-05-12
Attending: INTERNAL MEDICINE
Payer: MEDICARE

## 2018-05-12 VITALS
HEART RATE: 89 BPM | DIASTOLIC BLOOD PRESSURE: 78 MMHG | WEIGHT: 190 LBS | RESPIRATION RATE: 18 BRPM | HEIGHT: 66 IN | OXYGEN SATURATION: 95 % | BODY MASS INDEX: 30.53 KG/M2 | SYSTOLIC BLOOD PRESSURE: 122 MMHG | TEMPERATURE: 98.6 F

## 2018-05-12 DIAGNOSIS — R42 DIZZINESS AND GIDDINESS: ICD-10-CM

## 2018-05-12 DIAGNOSIS — Z79.01 ON CONTINUOUS ORAL ANTICOAGULATION: ICD-10-CM

## 2018-05-12 DIAGNOSIS — S41.112A SKIN TEAR OF LEFT UPPER ARM WITHOUT COMPLICATION, INITIAL ENCOUNTER: Primary | ICD-10-CM

## 2018-05-12 LAB — CREAT UR-MCNC: 1.3 MG/DL (ref 0.6–1.3)

## 2018-05-12 PROCEDURE — 82565 ASSAY OF CREATININE: CPT

## 2018-05-12 PROCEDURE — A9575 INJ GADOTERATE MEGLUMI 0.1ML: HCPCS | Performed by: INTERNAL MEDICINE

## 2018-05-12 PROCEDURE — 70553 MRI BRAIN STEM W/O & W/DYE: CPT

## 2018-05-12 PROCEDURE — 74011250636 HC RX REV CODE- 250/636: Performed by: INTERNAL MEDICINE

## 2018-05-12 PROCEDURE — 99282 EMERGENCY DEPT VISIT SF MDM: CPT

## 2018-05-12 RX ORDER — GADOTERATE MEGLUMINE 376.9 MG/ML
17 INJECTION INTRAVENOUS
Status: COMPLETED | OUTPATIENT
Start: 2018-05-12 | End: 2018-05-12

## 2018-05-12 RX ADMIN — GADOTERATE MEGLUMINE 17 ML: 376.9 INJECTION INTRAVENOUS at 08:00

## 2018-05-12 NOTE — ED TRIAGE NOTES
Had MRI this morning and left upper arm \"pinched\" by machine. Skin tear noted. Patient taking Plavix and bleeding has been constant since.

## 2018-05-12 NOTE — ED PROVIDER NOTES
EMERGENCY DEPARTMENT HISTORY AND PHYSICAL EXAM    Date: 5/12/2018  Patient Name: Rima Rosado    History of Presenting Illness     Chief Complaint   Patient presents with    Bleeding/Bruising         History Provided By: Patient    Chief Complaint: wound  Duration: just PTA  Timing:  Acute  Location: left upper arm  Quality: no pain  Severity: N/A  Modifying Factors: none  Associated Symptoms: bleeding      Additional History (Context): Rima Rosado is a 69 y.o. male with hypertension, hyperlipidemia and stroke who presents with wound to left upper arm. States was in MRI, arm got pinched and started to bleed. On anticoags (Plaxix aspirin) daily. Denies pain. Tetanus UTD. Here just because of bleeing. No other c/o. PCP: Joleen Walker MD    Current Outpatient Prescriptions   Medication Sig Dispense Refill    atorvastatin (LIPITOR) 40 mg tablet Take 1 Tab by mouth daily. 30 Tab 0    clopidogrel (PLAVIX) 75 mg tab Take 1 Tab by mouth daily. 90 Tab 0    aspirin delayed-release 81 mg tablet Take 1 Tab by mouth daily. 90 Tab 0    metFORMIN (GLUCOPHAGE) 500 mg tablet Take  by mouth two (2) times daily (with meals).  lisinopril-hydrochlorothiazide (PRINZIDE, ZESTORETIC) 20-12.5 mg per tablet Take  by mouth daily.  glyBURIDE-metFORMIN (GLUCOVANCE) 5-500 mg per tablet Take 1 Tab by mouth Daily (before breakfast). Past History     Past Medical History:  Past Medical History:   Diagnosis Date    Diabetes ([facility] [address]     Hypertension        Past Surgical History:  Past Surgical History:   Procedure Laterality Date    HX KNEE REPLACEMENT Right     HX ORTHOPAEDIC      rt knee twice       Family History:  History reviewed. No pertinent family history.     Social History:  Social History   Substance Use Topics    Smoking status: Former Smoker    Smokeless tobacco: Former User    Alcohol use No       Allergies:  No Known Allergies      Review of Systems   Review of Systems Constitutional: Negative for activity change, chills and fever. Musculoskeletal: Negative for arthralgias and myalgias. Skin: Positive for wound. Negative for color change. All other systems reviewed and are negative. All Other Systems Negative  Physical Exam     Vitals:    05/12/18 1724   BP: 122/78   Pulse: 89   Resp: 18   Temp: 98.6 °F (37 °C)   SpO2: 95%   Weight: 86.2 kg (190 lb)   Height: 5' 6\" (1.676 m)     Physical Exam   Constitutional: He appears well-developed and well-nourished. HENT:   Head: Normocephalic and atraumatic. Eyes: Conjunctivae are normal. No scleral icterus. Neck: Normal range of motion. Neck supple. No JVD present. Cardiovascular: Normal rate, regular rhythm and intact distal pulses. Pulmonary/Chest: Effort normal. No respiratory distress. Musculoskeletal: Normal range of motion. Neurological: He is alert. Skin: Skin is warm and dry. Laceration (skin tear in left upper arm as notated; mild oozing of blood noted) noted. Psychiatric: Judgment and thought content normal.   Nursing note and vitals reviewed. Diagnostic Study Results     Labs -     Recent Results (from the past 12 hour(s))   POC CREATININE    Collection Time: 05/12/18  7:03 AM   Result Value Ref Range    Creatinine, POC 1.3 0.6 - 1.3 MG/DL    GFRAA, POC >60 >60 ml/min/1.73m2    GFRNA, POC 54 (L) >60 ml/min/1.73m2       Radiologic Studies -   No orders to display     CT Results  (Last 48 hours)    None        CXR Results  (Last 48 hours)    None            Medical Decision Making   I am the first provider for this patient. I reviewed the vital signs, available nursing notes, past medical history, past surgical history, family history and social history. Vital Signs-Reviewed the patient's vital signs.     Pulse Oximetry Analysis - 95% on RA    Records Reviewed: Nursing Notes    Procedures:  Procedures    Provider Notes (Medical Decision Making):   69 y.o. presenting with skin tear to left upper arm. On anticoags, compliant with meds. Tetanus UTD. No lac requiring repair. Bandaged with nonstick dressing and ace wrap. No bleeding after 30 minutes of obs. Nonstick dressing provided to pt. Educated on loosening ace wrap if hand gets cold or numb. Stable for d/c.  Pt voiced understanding and agreement with plan. MED RECONCILIATION:  No current facility-administered medications for this encounter. Current Outpatient Prescriptions   Medication Sig    atorvastatin (LIPITOR) 40 mg tablet Take 1 Tab by mouth daily.  clopidogrel (PLAVIX) 75 mg tab Take 1 Tab by mouth daily.  aspirin delayed-release 81 mg tablet Take 1 Tab by mouth daily.  metFORMIN (GLUCOPHAGE) 500 mg tablet Take  by mouth two (2) times daily (with meals).  lisinopril-hydrochlorothiazide (PRINZIDE, ZESTORETIC) 20-12.5 mg per tablet Take  by mouth daily.  glyBURIDE-metFORMIN (GLUCOVANCE) 5-500 mg per tablet Take 1 Tab by mouth Daily (before breakfast). Disposition:  Home    DISCHARGE NOTE:     Pt has been reexamined. Feeling fine. Patient has no new complaints, changes, or physical findings. Care plan outlined and precautions discussed. Results of exam were reviewed with the patient. All medications were reviewed with the patient; will d/c home with no new medications. All of pt's questions and concerns were addressed. Patient was instructed and agrees to follow up with PCP, as well as to return to the ED upon further deterioration. Patient is ready to go home. Follow-up Information     Follow up With Details Comments [address] Schedule an appointment as soon as possible for a visit in 1 day As needed [address] [facility] 71 [facility] [address] [place] [address] EMERGENCY DEPT Go to As needed [address] [facility]  [phone]          Current Discharge Medication List        Diagnosis     Clinical Impression:   1.  Skin tear of left upper arm without complication, initial encounter    2.  On continuous oral anticoagulation

## 2018-05-12 NOTE — ED NOTES
Keisha Grigsby is a 69 y.o. male that was discharged in stable condition. The patients diagnosis, condition and treatment were explained to  patient and aftercare instructions were given. The patient verbalized understanding. Patient armband removed and shredded.

## 2018-05-12 NOTE — DISCHARGE INSTRUCTIONS
Skin Tears: Care Instructions  Your Care Instructions  As we get older, our skin gets drier and more fragile. Sometimes this can cause the outer layers of skin to split and tear open. Skin tears are treated in different ways. In some cases, doctors use pieces of tape called Steri-Strips to pull the skin together and help it heal. Other times, it's best to leave the tear open and cover it with a special wound-care bandage. Skin tears are usually not serious. They usually heal in a few weeks. But how long you take to heal depends on your body and the type of tear you have. Sometimes the torn piece of skin is used to protect the wound while it heals. But that piece of skin does not heal. It may fall off on its own. Or the doctor may remove it. As your tear heals, it's important to keep it clean to help prevent infection. The doctor has checked you carefully, but problems can develop later. If you notice any problems or new symptoms, get medical treatment right away. Follow-up care is a key part of your treatment and safety. Be sure to make and go to all appointments, and call your doctor if you are having problems. It's also a good idea to know your test results and keep a list of the medicines you take. How can you care for yourself at home? · If you have pain, ask your doctor if you can take an over-the-counter pain medicine, such as acetaminophen (Tylenol), ibuprofen (Advil, Motrin), or naproxen (Aleve). Be safe with medicines. Read and follow all instructions on the label. · If you have a bandage, follow your doctor's instructions for changing it. · If you have Steri-Strips, leave them on until they fall off. · Follow your doctor's instructions about bathing. · Gently wash the skin tear with plain water 2 times a day. Do not rub the area. · Let the area air dry. Or you can pat it carefully with a soft towel. When should you call for help?   Call your doctor now or seek immediate medical care if:  ? · You have signs of infection, such as:  ¨ Increased pain, swelling, warmth, or redness around the tear. ¨ Red streaks leading from the tear. ¨ Pus draining from the tear. ¨ A fever. ? · The tear starts to bleed a lot. Small amounts of blood are normal.   ? Watch closely for changes in your health, and be sure to contact your doctor if:  ? · You do not get better as expected. Where can you learn more? Go to http://mic-ezequiel.info/. Enter C359 in the search box to learn more about \"Skin Tears: Care Instructions. \"  Current as of: March 20, 2017  Content Version: 11.4  © 7783-1478 [email or web address]. Care instructions adapted under license by [employer] (which disclaims liability or warranty for this information). If you have questions about a medical condition or this instruction, always ask your healthcare professional. [address] any warranty or liability for your use of this information.

## 2018-08-24 ENCOUNTER — HOSPITAL ENCOUNTER (EMERGENCY)
Age: 78
Discharge: HOME | End: 2018-08-24
Attending: EMERGENCY MEDICINE
Payer: MEDICARE

## 2018-08-24 ENCOUNTER — APPOINTMENT (OUTPATIENT)
Dept: CT IMAGING | Age: 78
End: 2018-08-24
Attending: EMERGENCY MEDICINE
Payer: MEDICARE

## 2018-08-24 ENCOUNTER — APPOINTMENT (OUTPATIENT)
Dept: GENERAL RADIOLOGY | Age: 78
End: 2018-08-24
Attending: EMERGENCY MEDICINE
Payer: MEDICARE

## 2018-08-24 VITALS
HEIGHT: 66 IN | WEIGHT: 195 LBS | OXYGEN SATURATION: 93 % | HEART RATE: 75 BPM | SYSTOLIC BLOOD PRESSURE: 122 MMHG | RESPIRATION RATE: 16 BRPM | TEMPERATURE: 97.1 F | DIASTOLIC BLOOD PRESSURE: 70 MMHG | BODY MASS INDEX: 31.34 KG/M2

## 2018-08-24 DIAGNOSIS — H61.23 BILATERAL IMPACTED CERUMEN: ICD-10-CM

## 2018-08-24 DIAGNOSIS — R42 VERTIGO: Primary | ICD-10-CM

## 2018-08-24 DIAGNOSIS — E86.0 DEHYDRATION: ICD-10-CM

## 2018-08-24 DIAGNOSIS — J32.2 ETHMOID SINUSITIS, UNSPECIFIED CHRONICITY: ICD-10-CM

## 2018-08-24 LAB
ANION GAP SERPL CALC-SCNC: 11 MMOL/L (ref 3–18)
ATRIAL RATE: 78 BPM
BASOPHILS # BLD: 0 K/UL (ref 0–0.1)
BASOPHILS NFR BLD: 0 % (ref 0–2)
BUN SERPL-MCNC: 29 MG/DL (ref 7–18)
BUN/CREAT SERPL: 23 (ref 12–20)
CALCIUM SERPL-MCNC: 9.5 MG/DL (ref 8.5–10.1)
CALCULATED P AXIS, ECG09: 37 DEGREES
CALCULATED R AXIS, ECG10: 21 DEGREES
CALCULATED T AXIS, ECG11: 5 DEGREES
CHLORIDE SERPL-SCNC: 100 MMOL/L (ref 100–108)
CK MB CFR SERPL CALC: 2.9 % (ref 0–4)
CK MB SERPL-MCNC: 4.2 NG/ML (ref 5–25)
CK SERPL-CCNC: 144 U/L (ref 39–308)
CO2 SERPL-SCNC: 24 MMOL/L (ref 21–32)
CREAT SERPL-MCNC: 1.24 MG/DL (ref 0.6–1.3)
DIAGNOSIS, 93000: NORMAL
DIFFERENTIAL METHOD BLD: ABNORMAL
EOSINOPHIL # BLD: 0 K/UL (ref 0–0.4)
EOSINOPHIL NFR BLD: 0 % (ref 0–5)
ERYTHROCYTE [DISTWIDTH] IN BLOOD BY AUTOMATED COUNT: 12.1 % (ref 11.6–14.5)
GLUCOSE BLD STRIP.AUTO-MCNC: 213 MG/DL (ref 70–110)
GLUCOSE SERPL-MCNC: 216 MG/DL (ref 74–99)
HCT VFR BLD AUTO: 41.4 % (ref 36–48)
HGB BLD-MCNC: 14.7 G/DL (ref 13–16)
LYMPHOCYTES # BLD: 1 K/UL (ref 0.9–3.6)
LYMPHOCYTES NFR BLD: 12 % (ref 21–52)
MCH RBC QN AUTO: 33.3 PG (ref 24–34)
MCHC RBC AUTO-ENTMCNC: 35.5 G/DL (ref 31–37)
MCV RBC AUTO: 93.7 FL (ref 74–97)
MONOCYTES # BLD: 0.4 K/UL (ref 0.05–1.2)
MONOCYTES NFR BLD: 5 % (ref 3–10)
NEUTS SEG # BLD: 7 K/UL (ref 1.8–8)
NEUTS SEG NFR BLD: 83 % (ref 40–73)
P-R INTERVAL, ECG05: 198 MS
PLATELET # BLD AUTO: 234 K/UL (ref 135–420)
PMV BLD AUTO: 10.4 FL (ref 9.2–11.8)
POTASSIUM SERPL-SCNC: 4.1 MMOL/L (ref 3.5–5.5)
Q-T INTERVAL, ECG07: 394 MS
QRS DURATION, ECG06: 104 MS
QTC CALCULATION (BEZET), ECG08: 449 MS
RBC # BLD AUTO: 4.42 M/UL (ref 4.7–5.5)
SODIUM SERPL-SCNC: 135 MMOL/L (ref 136–145)
TROPONIN I SERPL-MCNC: <0.02 NG/ML (ref 0–0.06)
VENTRICULAR RATE, ECG03: 78 BPM
WBC # BLD AUTO: 8.5 K/UL (ref 4.6–13.2)

## 2018-08-24 PROCEDURE — 99284 EMERGENCY DEPT VISIT MOD MDM: CPT

## 2018-08-24 PROCEDURE — 93005 ELECTROCARDIOGRAM TRACING: CPT

## 2018-08-24 PROCEDURE — 71045 X-RAY EXAM CHEST 1 VIEW: CPT

## 2018-08-24 PROCEDURE — 82550 ASSAY OF CK (CPK): CPT | Performed by: EMERGENCY MEDICINE

## 2018-08-24 PROCEDURE — 99285 EMERGENCY DEPT VISIT HI MDM: CPT

## 2018-08-24 PROCEDURE — 74011250637 HC RX REV CODE- 250/637: Performed by: EMERGENCY MEDICINE

## 2018-08-24 PROCEDURE — 76010010392 HC REMOVAL IMPACTED WAX IRRIGATION/LVG UNI

## 2018-08-24 PROCEDURE — 70450 CT HEAD/BRAIN W/O DYE: CPT

## 2018-08-24 PROCEDURE — 85025 COMPLETE CBC W/AUTO DIFF WBC: CPT | Performed by: EMERGENCY MEDICINE

## 2018-08-24 PROCEDURE — 80048 BASIC METABOLIC PNL TOTAL CA: CPT | Performed by: EMERGENCY MEDICINE

## 2018-08-24 PROCEDURE — 96374 THER/PROPH/DIAG INJ IV PUSH: CPT

## 2018-08-24 PROCEDURE — 96361 HYDRATE IV INFUSION ADD-ON: CPT

## 2018-08-24 PROCEDURE — 74011250636 HC RX REV CODE- 250/636: Performed by: EMERGENCY MEDICINE

## 2018-08-24 PROCEDURE — 82962 GLUCOSE BLOOD TEST: CPT

## 2018-08-24 RX ORDER — ONDANSETRON 4 MG/1
4 TABLET, FILM COATED ORAL
Qty: 8 TAB | Refills: 0 | Status: SHIPPED | OUTPATIENT
Start: 2018-08-24

## 2018-08-24 RX ORDER — ACETAMINOPHEN AND CODEINE PHOSPHATE 300; 30 MG/1; MG/1
1 TABLET ORAL
COMMUNITY

## 2018-08-24 RX ORDER — ONDANSETRON 2 MG/ML
4 INJECTION INTRAMUSCULAR; INTRAVENOUS
Status: COMPLETED | OUTPATIENT
Start: 2018-08-24 | End: 2018-08-24

## 2018-08-24 RX ORDER — MECLIZINE HYDROCHLORIDE 25 MG/1
25 TABLET ORAL
COMMUNITY

## 2018-08-24 RX ORDER — DIAZEPAM 5 MG/1
5 TABLET ORAL
Status: COMPLETED | OUTPATIENT
Start: 2018-08-24 | End: 2018-08-24

## 2018-08-24 RX ORDER — CEPHALEXIN 500 MG/1
500 CAPSULE ORAL 4 TIMES DAILY
COMMUNITY

## 2018-08-24 RX ORDER — DIAZEPAM 5 MG/1
2.5 TABLET ORAL
Qty: 6 TAB | Refills: 0 | Status: SHIPPED | OUTPATIENT
Start: 2018-08-24

## 2018-08-24 RX ADMIN — CARBAMIDE PEROXIDE 6.5% 5 DROP: 6.5 LIQUID AURICULAR (OTIC) at 14:51

## 2018-08-24 RX ADMIN — ONDANSETRON 4 MG: 2 INJECTION, SOLUTION INTRAMUSCULAR; INTRAVENOUS at 13:00

## 2018-08-24 RX ADMIN — SODIUM CHLORIDE 1000 ML: 900 INJECTION, SOLUTION INTRAVENOUS at 13:00

## 2018-08-24 RX ADMIN — DIAZEPAM 5 MG: 5 TABLET ORAL at 13:00

## 2018-08-24 NOTE — ED TRIAGE NOTES
Patient started feeling dizzy, where \"room was spinning\" 2 days ago. Took Meclazine at home with no improvement. Saw PCP yesterday and she called in prescriptions for ear infection, but has not started medication yet.  Patient states he vomited approximately 12 x today

## 2018-08-24 NOTE — ED NOTES
Adela Wise is a 69 y.o. male that was discharged in good condition. The patients diagnosis, condition and treatment were explained to  patient and aftercare instructions were given. The patient verbalized understanding. Patient armband removed and shredded.

## 2018-08-24 NOTE — ED PROVIDER NOTES
EMERGENCY DEPARTMENT HISTORY AND PHYSICAL EXAM    12:42 PM      Date: 8/24/2018  Patient Name: Kari Do    History of Presenting Illness     Chief Complaint   Patient presents with    Dizziness    Vomiting         History Provided By: Patient    Chief Complaint: dizziness  Duration:  Days  Timing:  Intermittent  Location: neuro  Quality: n/a  Severity: Severe  Modifying Factors: none   Associated Symptoms: N/V      Additional History (Context): Kari Do is a 69 y.o. male with diabetes, hypertension and vertigo who presents with family due to severe dizziness with N/V that has been constant since this morning. The pt states his sx started initially 2 days ago with dizziness that felt like the room was spinning; became concerned to day because of the multiple episodes of vomiting. Tried meclizine to treat sx but had no relief. He seen his PCP yesterday for the similar sx also Wednesday he had episode of decreased hearing with a humming sound that resolved on it own; PCP checked ears but was told due to the excessive cerumen she was unable to see his TM ad sent him home with prescription for ear  that he has yet to . Thursday he was feeling fine. Notes hx of vertigo some years ago. Denies CP, SOB, fever, chills, abdominal pain, back pain, neck pain, weakness, numbness, or any other associated sx. No other complaints or concerns. PCP: Sravan Gomez MD      Past History     Past Medical History:  Past Medical History:   Diagnosis Date    Diabetes ([address]     Hypertension        Past Surgical History:  Past Surgical History:   Procedure Laterality Date    HX KNEE REPLACEMENT Right     HX ORTHOPAEDIC      rt knee twice       Family History:  History reviewed. No pertinent family history.     Social History:  Social History   Substance Use Topics    Smoking status: Former Smoker    Smokeless tobacco: Former User    Alcohol use No       Allergies:  No Known Allergies      Review of Systems       Review of Systems   Constitutional: Negative for fever. HENT: Positive for hearing loss (resolved). Negative for sore throat. Eyes: Negative for redness and visual disturbance. Respiratory: Negative for shortness of breath and wheezing. Cardiovascular: Negative for chest pain. Gastrointestinal: Positive for nausea and vomiting. Negative for abdominal pain. Endocrine: Negative for polyuria. Genitourinary: Negative for dysuria. Musculoskeletal: Negative for arthralgias and neck stiffness. Skin: Negative for rash. Neurological: Positive for dizziness. Negative for headaches. All other systems reviewed and are negative. Physical Exam     Visit Vitals    /76    Pulse 74    Temp 97.1 °F (36.2 °C)    Resp 14    Ht 5' 6\" (1.676 m)    Wt 88.5 kg (195 lb)    SpO2 94%    BMI 31.47 kg/m2         Physical Exam   Constitutional: He is oriented to person, place, and time. He appears well-developed and well-nourished. No distress. HENT:   Head: Normocephalic and atraumatic. Mouth/Throat: Oropharynx is clear and moist.   Moderate cerumen in ears bilaterally unable to TM. After ears were irrigated and lots of wax removed with irrigation and debrox. Able to see TM bilaterally. No signs of infection. Eyes: Conjunctivae and EOM are normal. Pupils are equal, round, and reactive to light. No scleral icterus. Neck: Normal range of motion. Neck supple. No ridgidity    Cardiovascular: Normal rate, normal heart sounds and intact distal pulses. Exam reveals no gallop. No murmur heard. Capillary refill < 3 seconds   Pulmonary/Chest: Effort normal and breath sounds normal. No stridor. No respiratory distress. He has no wheezes. Lungs clear. Abdominal: Soft. Normal appearance and bowel sounds are normal. He exhibits no distension. There is no tenderness. Musculoskeletal: Normal range of motion. He exhibits no edema.    Lymphadenopathy:     He has no cervical adenopathy. Neurological: He is alert and oriented to person, place, and time. No cranial nerve deficit or sensory deficit. He exhibits normal muscle tone. Coordination normal.   Sensation intact. Strength 5/5 in UE and LE. No cerebellar ataxia doing finger to nose. Skin: Skin is warm and dry. No rash noted. He is not diaphoretic. Psychiatric: He has a normal mood and affect. His behavior is normal.   Nursing note and vitals reviewed. Diagnostic Study Results     Labs -  Recent Results (from the past 12 hour(s))   EKG, 12 LEAD, INITIAL    Collection Time: 08/24/18 12:37 PM   Result Value Ref Range    Ventricular Rate 78 BPM    Atrial Rate 78 BPM    P-R Interval 198 ms    QRS Duration 104 ms    Q-T Interval 394 ms    QTC Calculation (Bezet) 449 ms    Calculated P Axis 37 degrees    Calculated R Axis 21 degrees    Calculated T Axis 5 degrees    Diagnosis       Sinus rhythm with frequent premature ventricular complexes  ST abnormality, possible digitalis effect  Abnormal ECG  When compared with ECG of 23-OCT-2017 19:24,  premature ventricular complexes are now present  Confirmed by Eloise Madden MD, [facility] ([id number]) on 8/24/2018 3:40:58 PM     GLUCOSE, POC    Collection Time: 08/24/18 12:49 PM   Result Value Ref Range    Glucose (POC) 213 (H) 70 - 110 mg/dL   CBC WITH AUTOMATED DIFF    Collection Time: 08/24/18 12:50 PM   Result Value Ref Range    WBC 8.5 4.6 - 13.2 K/uL    RBC 4.42 (L) 4.70 - 5.50 M/uL    HGB 14.7 13.0 - 16.0 g/dL    HCT 41.4 36.0 - 48.0 %    MCV 93.7 74.0 - 97.0 FL    MCH 33.3 24.0 - 34.0 PG    MCHC 35.5 31.0 - 37.0 g/dL    RDW 12.1 11.6 - 14.5 %    PLATELET 571 174 - 828 K/uL    MPV 10.4 9.2 - 11.8 FL    NEUTROPHILS 83 (H) 40 - 73 %    LYMPHOCYTES 12 (L) 21 - 52 %    MONOCYTES 5 3 - 10 %    EOSINOPHILS 0 0 - 5 %    BASOPHILS 0 0 - 2 %    ABS. NEUTROPHILS 7.0 1.8 - 8.0 K/UL    ABS. LYMPHOCYTES 1.0 0.9 - 3.6 K/UL    ABS. MONOCYTES 0.4 0.05 - 1.2 K/UL    ABS. EOSINOPHILS 0.0 0.0 - 0.4 K/UL    ABS. BASOPHILS 0.0 0.0 - 0.1 K/UL    DF AUTOMATED     METABOLIC PANEL, BASIC    Collection Time: 08/24/18 12:50 PM   Result Value Ref Range    Sodium 135 (L) 136 - 145 mmol/L    Potassium 4.1 3.5 - 5.5 mmol/L    Chloride 100 100 - 108 mmol/L    CO2 24 21 - 32 mmol/L    Anion gap 11 3.0 - 18 mmol/L    Glucose 216 (H) 74 - 99 mg/dL    BUN 29 (H) 7.0 - 18 MG/DL    Creatinine 1.24 0.6 - 1.3 MG/DL    BUN/Creatinine ratio 23 (H) 12 - 20      GFR est AA >60 >60 ml/min/1.73m2    GFR est non-AA 57 (L) >60 ml/min/1.73m2    Calcium 9.5 8.5 - 10.1 MG/DL   CARDIAC PANEL,(CK, CKMB & TROPONIN)    Collection Time: 08/24/18 12:50 PM   Result Value Ref Range     39 - 308 U/L    CK - MB 4.2 (H) <3.6 ng/ml    CK-MB Index 2.9 0.0 - 4.0 %    Troponin-I, Qt. <0.02 0.00 - 0.06 NG/ML       Radiologic Studies -   CT HEAD WO CONT   Final Result   IMPRESSION:  1. No acute intracranial process.     2. Chronic small vessel ischemic changes. No significant interval change.     3. Sinus mucosal disease. XR CHEST PORT   Final Result   IMPRESSION:    Hypoinflation. Either calcified pleural plaques or granulomas. Medical Decision Making   I am the first provider for this patient. I reviewed the vital signs, available nursing notes, past medical history, past surgical history, family history and social history. Vital Signs-Reviewed the patient's vital signs. Pulse Oximetry Analysis - 95% RA non hypoxic    EKG: Interpreted by the EP. Time Interpreted: 2201   Rate: 78   Rhythm:sinus rhythm with frequent PVC   Interpretation: normal Qtc. No ST elevation. No ST depression.     Records Reviewed: Nursing Notes and Old Medical Records (Time of Review: 12:42 PM)    Provider Notes (Medical Decision Making):  MDM  Number of Diagnoses or Management Options  Bilateral impacted cerumen:   Dehydration:   Ethmoid sinusitis, unspecified chronicity:   Vertigo: new, needed workup  Diagnosis management comments: DDx: central vs perphral vertigo , orthostatic, dehydration , metabolic, cardiac, brain mass    Will get CT brain. Give IVF, valium, and Zofran. Get EKG labs and CXR. Amount and/or Complexity of Data Reviewed  Clinical lab tests: ordered and reviewed  Tests in the radiology section of CPT®: ordered and reviewed  Tests in the medicine section of CPT®: ordered and reviewed  Discussion of test results with the performing providers: yes  Review and summarize past medical records: yes  Discuss the patient with other providers: yes  Independent visualization of images, tracings, or specimens: yes    Patient Progress  Patient progress: improved      Medications   sodium chloride 0.9 % bolus infusion 1,000 mL (0 mL IntraVENous IV Completed 8/24/18 1451)   diazePAM (VALIUM) tablet 5 mg (5 mg Oral Given 8/24/18 1300)   ondansetron (ZOFRAN) injection 4 mg (4 mg IntraVENous Given 8/24/18 1300)   carbamide peroxide (DEBROX) 6.5 % otic solution 5 Drop (5 Drops Both Ears Given 8/24/18 1451)       ED Course: Progress Notes, Reevaluation, and Consults:  2:57 PM Dr. Jeanie Raimrez agrees to evaluate the pt.    3:11 PM, 8/24/2018   Consult:  Discussed care with Dr. Jeanie Ramirez, neuro. Standard discussion; including history of patients chief complaint, available diagnostic results, and treatment course. He states peripheral vertigo. Not a stroke. No further imaging or work up needed. agrees with valium give script for 2.5 mg every 6hrs as needed. Also gave the pt vertigo exercises. I have reassessed the patient. I have discussed the workup, results and plan with the patient and patient is in agreement. Patient is feeling much better. Patient will be prescribed valium, zofran. Patient was discharge in stable condition. Patient was given outpatient follow up. Patient is to return to emergency department if any new or worsening condition. 5:43PM  I called pt on his home phone. Discussed sinus infection on CT head, will call him in a px for augmentin.  He gave me [facility] pharmacy. Pt thankful for call. Diagnosis     Clinical Impression:   1. Vertigo    2. Bilateral impacted cerumen    3. Dehydration    4. Ethmoid sinusitis, unspecified chronicity        Disposition: home     Follow-up Information     Follow up With Details Comments [address] Schedule an appointment as soon as possible for a visit in 3 days follow up. [address] [facility]      Bhupinder Portillo MD Call in 3 days follow up [address] [address] [place]      [address] [facility] EMERGENCY DEPT  If symptoms worsen, As needed [address] [place]  [phone]           Patient's Medications   Start Taking    DIAZEPAM (VALIUM) 5 MG TABLET    Take 0.5 Tabs by mouth every six (6) hours as needed. Max Daily Amount: 10 mg. Indications: vertigo    ONDANSETRON HCL (ZOFRAN) 4 MG TABLET    Take 1 Tab by mouth every eight (8) hours as needed for Nausea. Continue Taking    ACETAMINOPHEN-CODEINE (TYLENOL-CODEINE #3) 300-30 MG PER TABLET    Take 1 Tab by mouth every four (4) hours as needed for Pain. ASPIRIN DELAYED-RELEASE 81 MG TABLET    Take 1 Tab by mouth daily. ATORVASTATIN (LIPITOR) 40 MG TABLET    Take 1 Tab by mouth daily. CEPHALEXIN (KEFLEX) 500 MG CAPSULE    Take 500 mg by mouth four (4) times daily. CLOPIDOGREL (PLAVIX) 75 MG TAB    Take 1 Tab by mouth daily. GLYBURIDE-METFORMIN (GLUCOVANCE) 5-500 MG PER TABLET    Take 1 Tab by mouth Daily (before breakfast). LISINOPRIL-HYDROCHLOROTHIAZIDE (PRINZIDE, ZESTORETIC) 20-12.5 MG PER TABLET    Take  by mouth daily. MECLIZINE (ANTIVERT) 25 MG TABLET    Take 25 mg by mouth three (3) times daily as needed. METFORMIN (GLUCOPHAGE) 500 MG TABLET    Take  by mouth two (2) times daily (with meals).    These Medications have changed    No medications on file   Stop Taking    No medications on file _______________________________    Attestations:  Scribe Attestation     Lucille Cao acting as a scribe for and in the presence of Charisma Hernandez DO      August 24, 2018 at 1:12 PM       Provider Attestation:      I personally performed the services described in the documentation, reviewed the documentation, as recorded by the scribe in my presence, and it accurately and completely records my words and actions.  August 24, 2018 at 1:12 PM - Charisma Hernandez DO    _______________________________

## 2018-08-24 NOTE — DISCHARGE INSTRUCTIONS
Vertigo: Care Instructions  Your Care Instructions    Vertigo is the feeling that you or your surroundings are moving when there is no actual movement. It is often described as a feeling of spinning, whirling, falling, or tilting. Vertigo may make you vomit or feel nauseated. You may have trouble standing or walking and may lose your balance. Vertigo is often related to an inner ear problem, but it can have other more serious causes. If vertigo continues, you may need more tests to find its cause. Follow-up care is a key part of your treatment and safety. Be sure to make and go to all appointments, and call your doctor if you are having problems. It's also a good idea to know your test results and keep a list of the medicines you take. How can you care for yourself at home? · Do not lie flat on your back. Prop yourself up slightly. This may reduce the spinning feeling. Keep your eyes open. · Move slowly so that you do not fall. · If your doctor recommends medicine, take it exactly as directed. · Do not drive while you are having vertigo. Certain exercises, called Rhoades-Daroff exercises, can help decrease vertigo. To do Rhoades-Daroff exercises:  · Sit on the edge of a bed or sofa and quickly lie down on the side that causes the worst vertigo. Lie on your side with your ear down. · Stay in this position for at least 30 seconds or until the vertigo goes away. · Sit up. If this causes vertigo, wait for it to stop. · Repeat the procedure on the other side. · Repeat this 10 times. Do these exercises 2 times a day until the vertigo is gone. When should you call for help? Call 911 anytime you think you may need emergency care. For example, call if:    · You passed out (lost consciousness).     · You have symptoms of a stroke. These may include:  ¨ Sudden numbness, tingling, weakness, or loss of movement in your face, arm, or leg, especially on only one side of your body.   ¨ Sudden vision changes. ¨ Sudden trouble speaking. ¨ Sudden confusion or trouble understanding simple statements. ¨ Sudden problems with walking or balance. ¨ A sudden, severe headache that is different from past headaches.    Call your doctor now or seek immediate medical care if:    · Vertigo occurs with a fever, a headache, or ringing in your ears.     · You have new or increased nausea and vomiting.    Watch closely for changes in your health, and be sure to contact your doctor if:    · Vertigo gets worse or happens more often.     · Vertigo has not gotten better after 2 weeks. Where can you learn more? Go to http://mic-ezequiel.info/. Enter K834 in the search box to learn more about \"Vertigo: Care Instructions. \"  Current as of: May 12, 2017  Content Version: 11.7  © 4155-9454 [employer]. Care instructions adapted under license by [employer] (which disclaims liability or warranty for this information). If you have questions about a medical condition or this instruction, always ask your healthcare professional. [place] [address] any warranty or liability for your use of this information. Vertigo: Exercises  Your Care Instructions  Here are some examples of typical rehabilitation exercises for your condition. Start each exercise slowly. Ease off the exercise if you start to have pain. Your doctor or physical therapist will tell you when you can start these exercises and which ones will work best for you. How to do the exercises  Exercise 1    1. Stand with a chair in front of you and a wall behind you. If you begin to fall, you may use them for support. 2. Stand with your feet together and your arms at your sides. 3. Move your head up and down 10 times. Exercise 2    1. Move your head side to side 10 times. Exercise 3    1. Move your head diagonally up and down 10 times. Exercise 4    1.  Move your head diagonally up and down 10 times on the other side. Follow-up care is a key part of your treatment and safety. Be sure to make and go to all appointments, and call your doctor if you are having problems. It's also a good idea to know your test results and keep a list of the medicines you take. Where can you learn more? Go to http://mic-ezequiel.info/. Enter F349 in the search box to learn more about \"Vertigo: Exercises. \"  Current as of: May 4, 2017  Content Version: 11.7  © 0201-0286 [employer]. Care instructions adapted under license by [employer] (which disclaims liability or warranty for this information). If you have questions about a medical condition or this instruction, always ask your healthcare professional. [address] any warranty or liability for your use of this information. Earwax Blockage: Care Instructions  Your Care Instructions    Earwax is a natural substance that protects the ear canal. Normally, earwax drains from the ears and does not cause problems. Sometimes earwax builds up and hardens. Earwax blockage (also called cerumen impaction) can cause some loss of hearing and pain. When wax is tightly packed, you will need to have your doctor remove it. Follow-up care is a key part of your treatment and safety. Be sure to make and go to all appointments, and call your doctor if you are having problems. It's also a good idea to know your test results and keep a list of the medicines you take. How can you care for yourself at home? · Do not try to remove earwax with cotton swabs, fingers, or other objects. This can make the blockage worse and damage the eardrum. · If your doctor recommends that you try to remove earwax at home:  ¨ Soften and loosen the earwax with warm mineral oil. You also can try hydrogen peroxide mixed with an equal amount of room temperature water.  Place 2 drops of the fluid, warmed to body temperature, in the ear two times a day for up to 5 days. ¨ Once the wax is loose and soft, all that is usually needed to remove it from the ear canal is a gentle, warm shower. Direct the water into the ear, then tip your head to let the earwax drain out. Dry your ear thoroughly with a hair dryer set on low. Hold the dryer several inches from your ear. ¨ If the warm mineral oil and shower do not work, use an over-the-counter wax softener. Read and follow all instructions on the label. After using the wax softener, use an ear syringe to gently flush the ear. Make sure the flushing solution is body temperature. Cool or hot fluids in the ear can cause dizziness. When should you call for help? Call your doctor now or seek immediate medical care if:    · Pus or blood drains from your ear.     · Your ears are ringing or feel full.     · You have a loss of hearing.    Watch closely for changes in your health, and be sure to contact your doctor if:    · You have pain or reduced hearing after 1 week of home treatment.     · You have any new symptoms, such as nausea or balance problems. Where can you learn more? Go to http://mic-ezequiel.info/. Enter S292 in the search box to learn more about \"Earwax Blockage: Care Instructions. \"  Current as of: November 20, 2017  Content Version: 11.7  © 2017-1505 [employer]. Care instructions adapted under license by [employer] (which disclaims liability or warranty for this information). If you have questions about a medical condition or this instruction, always ask your healthcare professional. [place] [address] any warranty or liability for your use of this information. Dehydration: Care Instructions  Your Care Instructions  Dehydration happens when your body loses too much fluid. This might happen when you do not drink enough water or you lose large amounts of fluids from your body because of diarrhea, vomiting, or sweating.  Severe dehydration can be life-threatening. Water and minerals called electrolytes help put your body fluids back in balance. Learn the early signs of fluid loss, and drink more fluids to prevent dehydration. Follow-up care is a key part of your treatment and safety. Be sure to make and go to all appointments, and call your doctor if you are having problems. It's also a good idea to know your test results and keep a list of the medicines you take. How can you care for yourself at home? · To prevent dehydration, drink plenty of fluids, enough so that your urine is light yellow or clear like water. Choose water and other caffeine-free clear liquids until you feel better. If you have kidney, heart, or liver disease and have to limit fluids, talk with your doctor before you increase the amount of fluids you drink. · If you do not feel like eating or drinking, try taking small sips of water, sports drinks, or other rehydration drinks. · Get plenty of rest.  To prevent dehydration  · Add more fluids to your diet and daily routine, unless your doctor has told you not to. · During hot weather, drink more fluids. Drink even more fluids if you exercise a lot. Stay away from drinks with alcohol or caffeine. · Watch for the symptoms of dehydration. These include:  ¨ A dry, sticky mouth. ¨ Dark yellow urine, and not much of it. ¨ Dry and sunken eyes. ¨ Feeling very tired. · Learn what problems can lead to dehydration. These include:  ¨ Diarrhea, fever, and vomiting. ¨ Any illness with a fever, such as pneumonia or the flu. ¨ Activities that cause heavy sweating, such as endurance races and heavy outdoor work in hot or humid weather. ¨ Alcohol or drug abuse or withdrawal.  ¨ Certain medicines, such as cold and allergy pills (antihistamines), diet pills (diuretics), and laxatives. ¨ Certain diseases, such as diabetes, cancer, and heart or kidney disease. When should you call for help?   Call 911 anytime you think you may need emergency care. For example, call if:    · You passed out (lost consciousness).    Call your doctor now or seek immediate medical care if:    · You are confused and cannot think clearly.     · You are dizzy or lightheaded, or you feel like you may faint.     · You have signs of needing more fluids. You have sunken eyes and a dry mouth, and you pass only a little dark urine.     · You cannot keep fluids down.    Watch closely for changes in your health, and be sure to contact your doctor if:    · You are not making tears.     · Your skin is very dry and sags slowly back into place after you pinch it.     · Your mouth and eyes are very dry. Where can you learn more? Go to http://mic-ezequiel.info/. Enter A534 in the search box to learn more about \"Dehydration: Care Instructions. \"  Current as of: November 20, 2017  Content Version: 11.7  © 5504-4572 [employer]. Care instructions adapted under license by [employer] (which disclaims liability or warranty for this information). If you have questions about a medical condition or this instruction, always ask your healthcare professional. [place] [address] any warranty or liability for your use of this information.

## 2018-08-28 ENCOUNTER — OFFICE VISIT (OUTPATIENT)
Dept: NEUROLOGY | Age: 78
End: 2018-08-28

## 2018-08-28 VITALS
HEART RATE: 56 BPM | BODY MASS INDEX: 31.02 KG/M2 | TEMPERATURE: 97.6 F | DIASTOLIC BLOOD PRESSURE: 72 MMHG | HEIGHT: 66 IN | SYSTOLIC BLOOD PRESSURE: 150 MMHG | OXYGEN SATURATION: 94 % | RESPIRATION RATE: 16 BRPM | WEIGHT: 193 LBS

## 2018-08-28 DIAGNOSIS — I63.312 CEREBROVASCULAR ACCIDENT (CVA) DUE TO THROMBOSIS OF LEFT MIDDLE CEREBRAL ARTERY (HCC): ICD-10-CM

## 2018-08-28 DIAGNOSIS — H81.313 AUDITORY VERTIGO INVOLVING BOTH EARS: Primary | ICD-10-CM

## 2018-08-28 RX ORDER — BISMUTH SUBSALICYLATE 262 MG
1 TABLET,CHEWABLE ORAL DAILY
COMMUNITY

## 2018-08-28 RX ORDER — METFORMIN HYDROCHLORIDE 500 MG/1
TABLET ORAL
COMMUNITY

## 2018-08-28 RX ORDER — GLYBURIDE 5 MG/1
TABLET ORAL
COMMUNITY

## 2018-08-28 RX ORDER — EZETIMIBE 10 MG/1
TABLET ORAL
COMMUNITY

## 2018-08-28 RX ORDER — AMOXICILLIN AND CLAVULANATE POTASSIUM 500; 125 MG/1; MG/1
1 TABLET, FILM COATED ORAL 2 TIMES DAILY
COMMUNITY

## 2018-08-28 NOTE — PROGRESS NOTES
Henok Ambrose is a 69 y.o., right handed male, with an established history of hypertension, diabetes, hypercholesterolemia, who comes in complaining of vertigo. This started on Thursday morning about 5 days ago with dry heaving. This lasted about 4 hours and resolved. He was fine the rest of the day. On Friday, the very next day, he had the recurrent episodes of dry heaving for hours, then was stricken with severe vertigo. He felt the world spinning around him from left to right. This lasted about 1 hour and 15 minutes. This basically resolved completely. He feels a little off balance, but bascially is back to his baseline. His son, who is an EMT, took him immediately to the ED, where he was evaluated and sent home for symptomatic vertigo. He was given Valium which seemed to help, but did knock him out. He has significant hearing lose and pretty severe recurrent tinnitus. 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 He's never had vertigo before. He had an MRI scan of the brain done back in May for some intermittent weakness of the right hand that resolved. Sounds like a small left subcortical stroke. Social History; , lives with his wife. He denies alcohol, tobacco or illicit drug use. Retired from the [employer]. Family History; father  from unknown causes, basically  suddenly at home age 80. Mother sounds like she may have had undiagnosed cancer. 4 siblings, one  from lung disease. Others are in good health. Current Outpatient Prescriptions Medication Sig Dispense Refill  glyBURIDE (DIABETA) 5 mg tablet Take  by mouth. 2 tabs AM, 1 tab PM    
 ezetimibe (ZETIA) 10 mg tablet Take  by mouth.  metFORMIN (GLUCOPHAGE) 500 mg tablet Take  by mouth three (3) times daily (with meals).  amoxicillin-clavulanate (AUGMENTIN) 500-125 mg per tablet Take 1 Tab by mouth two (2) times a day.  beta-carotene,A,-vits C,E/mins (VISION-LEE PO) Take  by mouth.  multivitamin (ONE A DAY) tablet Take 1 Tab by mouth daily.  cephALEXin (KEFLEX) 500 mg capsule Take 500 mg by mouth four (4) times daily.  meclizine (ANTIVERT) 25 mg tablet Take 25 mg by mouth three (3) times daily as needed.  ondansetron hcl (ZOFRAN) 4 mg tablet Take 1 Tab by mouth every eight (8) hours as needed for Nausea. 8 Tab 0  
 atorvastatin (LIPITOR) 40 mg tablet Take 1 Tab by mouth daily. 30 Tab 0  clopidogrel (PLAVIX) 75 mg tab Take 1 Tab by mouth daily. 90 Tab 0  
 aspirin delayed-release 81 mg tablet Take 1 Tab by mouth daily. 90 Tab 0  
 lisinopril-hydrochlorothiazide (PRINZIDE, ZESTORETIC) 20-12.5 mg per tablet Take  by mouth daily.  acetaminophen-codeine (TYLENOL-CODEINE #3) 300-30 mg per tablet Take 1 Tab by mouth every four (4) hours as needed for Pain.  diazePAM (VALIUM) 5 mg tablet Take 0.5 Tabs by mouth every six (6) hours as needed. Max Daily Amount: 10 mg. Indications: vertigo 6 Tab 0  
 glyBURIDE-metFORMIN (GLUCOVANCE) 5-500 mg per tablet Take 1 Tab by mouth Daily (before breakfast). Past Medical History:  
Diagnosis Date  Diabetes ([facility] [facility]  Hypertension Past Surgical History:  
Procedure Laterality Date  HX KNEE REPLACEMENT Right  HX ORTHOPAEDIC    
 rt knee twice No Known Allergies Patient Active Problem List  
Diagnosis Code  Right hand weakness R29.898  [facility] ([facility] I63.9 Review of Systems: As above otherwise 11 point review of systems negative including;  
Constitutional no fever or chills Skin denies rash or itching HENT  Denies tinnitus, hearing lose Eyes denies diplopia vision lose Respiratory denies shortness of breath Cardiovascular denies chest pain, dyspnea on exertion Gastrointestinal denies nausea, vomiting, diarrhea, constipation Genitourinary denies incontinence Musculoskeletal denies joint pain or swelling Endocrine denies weight change Hematology denies easy bruising or bleeding Neurological as above in HPI PHYSICAL EXAMINATION:   
 
VITAL SIGNS:   
Visit Vitals  /72 (BP 1 Location: Left arm, BP Patient Position: Standing)  Pulse (!) 56  Temp 97.6 °F (36.4 °C) (Oral)  Resp 16  
 Ht 5' 6\" (1.676 m)  Wt 87.5 kg (193 lb)  SpO2 94%  BMI 31.15 kg/m2 There was no changes in his orthostatic vital signs. GENERAL: The patient is well developed, well nourished, and in no apparent distress. EXTREMITIES: No clubbing, cyanosis, or edema is identified. Pulses 2+ and symmetrical.  Muscle tone is normal. 
HEAD:   Ear, nose, and throat appear to be without trauma. The patient is normocephalic. NEUROLOGIC EXAMINATION 
 
MENTAL STATUS: The patient is awake, alert, and oriented x 4. Fund of knowledge is adequate. Speech is fluent and memory appears to be intact, both long and short term. CRANIAL NERVES: II  Visual fields are full to confrontation. Funduscopic examination reveals flat disks bilaterally. Pupils are both 3 mm and briskly reactive to light and accommodation. III, IV, VI  Extraocular movements are intact and there is no nystagmus. V  Facial sensation is intact to pinprick and light touch. VII  Face is symmetrical.  
VIII - Hearing is present. IX, X, [address] rises symmetrically. Gag is present. Tongue is in the midline. XI - Shoulder shrugging and head turning intact MOTOR:  The patient is 5/5 in all four limbs without any drift. Fine finger movements are symmetrical.  Isolated motor group testing reveals no focal abnormalities. Tone is normal.  Sensory examination is intact to pinprick, light touch and position sense testing. Reflexes are 2+ and symmetrical. Plantars are down going. Cerebellar examination reveals no gross ataxia or dysmetria.  Gait is normal and the patient can tandem walk without any difficulty. Final result (Exam End: 8/24/2018  1:30 PM) Open Study Result EXAM:  CT Head without Contrast 
   
CLINICAL INDICATION:  Dizziness. Vomiting multiple times today. 
   
COMPARISON:  10/23/17. 
   
TECHNIQUE:  Helical volumetric CT imaging of the head is performed from the base 
of the skull to the vertex without IV contrast administration. Axial, coronal 
and sagittal reconstruction images are generated from this volumetric data set. 
  
- Radiation dose:   mGy-cm. 
- Note:  Dose optimization techniques are utilized as appropriate to the 
performed exam with combination of automated exposure control, adjustment of mA 
and/or kV according to patient size, and use of iterative reconstructive 
technique.  
   
FINDINGS:  
  
No evidence of intracranial hemorrhage or hematoma or acute infarct is detected. No definite space-occupying lesion is apparent. No significant mass effect 
such as midline shift or sulcal effacement.   
  
There are mild periventricular decreased white matter attenuation changes, 
suggestive of chronic small vessel ischemia. Left frontal lobe with a tiny 
focus of encephalomalacia (axial #20) is suggestive of an old small infarct. Shilo Reasoner The ventricles and cisterns are normal in size and configuration without 
evidence of abnormal enlargement or effacement. 
  
The calvarium appears unremarkable. The visualized paranasal sinuses show 
patchy mucosal thickening in the ethmoid sinuses. No significant interval 
changes are observed. 
   
IMPRESSION IMPRESSION: 
   
1. No acute intracranial process. 
  
2. Chronic small vessel ischemic changes. No significant interval change. 
  
3. Sinus mucosal disease. Final result (Exam End: 5/12/2018  7:20 AM) Open Study Result MR brain without and with contrast 
  
CPT code: 70060 
  
History: Several weeks of dizziness and giddiness.  No injury. 
  
Comparison: Prior MRI October 2017 
  
 Technique: Brain scanned with axial and sagittal T1W scans, axial T2W scans, 
axial FLAIR, axial or coronal T2*GRE heme sensitive sequence, axial diffusion 
weighted images, and post gadolinium axial and coronal T1W scans. Contrast used: 
17 cc Dotarem 
  
Findings:  
  
Mild atrophy as before. Mild chronic periventricular white matter disease. There 
is the expected interval maturation with small focus of encephalomalacia/gliosis 
at the left frontoparietal junction, site of small infarct sustained October 2017. 
  
Diffusion imaging is normal. No acute infarction. No acute hemorrhage. No mass 
lesion. No pathologic enhancement. 
  
Ventricles and cisterns remain midline in position. 
  
Bilateral IAC regions are unremarkable. 
  
Parasellar region is unremarkable. 
  
There are appropriate flow voids within the major skull base vasculature. 
  
The cervicomedullary junction is patent. 
  
Orbits show no acute pathology. 
  
Minimal ethmoid opacification as well as sphenoid sinus mucosal thickening.  
  
  
  
IMPRESSION IMPRESSION: 
  
1. No acute intracranial hemorrhage or territorial infarct. No mass or mass 
effect. 2. Mild atrophy and chronic small vessel ischemic changes. 
-Interval maturation of the focus of small infarct at left frontoparietal 
junction from October 2017. 
  
Thank you for this referral.  
 
 
 
I have reviewed the above imagines myself. CBC:  
Lab Results Component Value Date/Time WBC 8.5 08/24/2018 12:50 PM  
 RBC 4.42 (L) 08/24/2018 12:50 PM  
 HGB 14.7 08/24/2018 12:50 PM  
 HCT 41.4 08/24/2018 12:50 PM  
 PLATELET 151 37/96/0829 12:50 PM  
 
BMP:  
Lab Results Component Value Date/Time  Glucose 216 (H) 08/24/2018 12:50 PM  
 Sodium 135 (L) 08/24/2018 12:50 PM  
 Potassium 4.1 08/24/2018 12:50 PM  
 Chloride 100 08/24/2018 12:50 PM  
 CO2 24 08/24/2018 12:50 PM  
 BUN 29 (H) 08/24/2018 12:50 PM  
 Creatinine 1.24 08/24/2018 12:50 PM  
 Calcium 9.5 08/24/2018 12:50 PM  
 
CMP:  
Lab Results Component Value Date/Time Glucose 216 (H) 08/24/2018 12:50 PM  
 Sodium 135 (L) 08/24/2018 12:50 PM  
 Potassium 4.1 08/24/2018 12:50 PM  
 Chloride 100 08/24/2018 12:50 PM  
 CO2 24 08/24/2018 12:50 PM  
 BUN 29 (H) 08/24/2018 12:50 PM  
 Creatinine 1.24 08/24/2018 12:50 PM  
 Calcium 9.5 08/24/2018 12:50 PM  
 Anion gap 11 08/24/2018 12:50 PM  
 BUN/Creatinine ratio 23 (H) 08/24/2018 12:50 PM  
 
Coagulation:  
Lab Results Component Value Date/Time Prothrombin time 12.1 10/23/2017 07:15 PM  
 INR 0.9 10/23/2017 07:15 PM  
 aPTT 33.0 10/23/2017 07:15 PM  
 
Cardiac markers:  
Lab Results Component Value Date/Time  08/24/2018 12:50 PM  
 CK-MB Index 2.9 08/24/2018 12:50 PM  
  
 
 
Impression: Fairly certain that this was a case of benign positional vertigo and this man who has risk factors including tinnitus and hearing loss. He has some significant hearing loss on the right greater than the left ear. 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 Currently he is asymptomatic luckily. 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  This was documented with an MRI scan back in May of this year. Plan: Recommend that he continue his antiplatelet therapy. He is getting treated aggressively for his diabetes and hypertension as well as his cholesterol. He should maintain a LDL cholesterol of less than 70 for optimal stroke prevention. In reference to the vertigo and recurrent tinnitus and hearing loss, symptomatic treatment only with meclizine is warranted at this time. Going to see him back here in 6 months just to see how he is doing and make sure he occasions for stroke prevention. Thank you for allowing me to evaluate this patient. PLEASE NOTE:  
This document has been produced using voice recognition software. Unrecognized errors in transcription may be present.

## 2018-08-28 NOTE — PROGRESS NOTES
Chief Complaint Patient presents with Santi Establish Care  Neurologic Problem  
  seen at [facility] ED for vertigo PHQ over the last two weeks 8/28/2018 Little interest or pleasure in doing things Not at all Feeling down, depressed, irritable, or hopeless Not at all Total Score PHQ 2 0 Fall Risk Assessment, last 12 mths 8/28/2018 Able to walk? Yes Fall in past 12 months?  No

## 2018-08-28 NOTE — LETTER
8/28/2018 2:07 PM 
 
Patient:  Adriana Lott YOB: 1940 Date of Visit: 8/28/2018 Dear Monica Grace MD 
[address] [address] VIA In Basket 
 : Thank you for referring Mr. Amelia Garcia to me for evaluation/treatment. Below are the relevant portions of my assessment and plan of care. If you have questions, please do not hesitate to call me. I look forward to following Mr. Oakley Homans along with you.  
 
 
 
Sincerely, 
 
 
Rashida Ely MD

## 2018-08-28 NOTE — MR AVS SNAPSHOT
[address] [facility] [address] [facility] 
[phone] Patient: Charline Kennedy MRN: [id number] UEV:9/2/0269 Visit Information Date & Time Provider Department Dept. Phone Encounter #  
 8/28/2018  1:00 PM Reji Peng MD [facility] at [address] [facility] [id number] Follow-up Instructions Return in about 6 months (around 2/28/2019). Upcoming Health Maintenance Date Due ZOSTER VACCINE AGE 60> 6/7/2001 GLAUCOMA SCREENING Q2Y 8/7/2006 Pneumococcal 65+ Low/Medium Risk (1 of 2 - PCV13) 8/7/2006 MEDICARE YEARLY EXAM 3/14/2018 Influenza Age 5 to Adult 8/1/2018 DTaP/Tdap/Td series (2 - Td) 4/15/2025 Allergies as of 8/28/2018  Review Complete On: 8/28/2018 By: Jaclyn Morrison LPN No Known Allergies Current Immunizations  Reviewed on 10/24/2017 Name Date Tdap 4/15/2015  4:54 PM  
  
 Not reviewed this visit You Were Diagnosed With   
  
 Codes Comments Auditory vertigo involving both ears    -  Primary ICD-10-CM: H81.313 ICD-9-CM: 386.19 Cerebrovascular accident (CVA) due to thrombosis of left middle cerebral artery ([address]     ICD-10-CM: Z23.443 ICD-9-CM: 434.01 Vitals BP Pulse Temp Resp Height(growth percentile) Weight(growth percentile) 150/72 (BP 1 Location: Left arm, BP Patient Position: Standing) (!) 56 97.6 °F (36.4 °C) (Oral) 16 5' 6\" (1.676 m) 193 lb (87.5 kg) SpO2 BMI Smoking Status 94% 31.15 kg/m2 Former Smoker Vitals History BMI and BSA Data Body Mass Index Body Surface Area  
 31.15 kg/m 2 2.02 m 2 Preferred Pharmacy Pharmacy Name Phone [address] [facility] [phone] Your Updated Medication List  
  
   
This list is accurate as of 8/28/18  1:50 PM.  Always use your most recent med list.  
  
  
  
  
 amoxicillin-clavulanate 500-125 mg per tablet Commonly known as:  AUGMENTIN Take 1 Tab by mouth two (2) times a day. aspirin delayed-release 81 mg tablet Take 1 Tab by mouth daily. atorvastatin 40 mg tablet Commonly known as:  LIPITOR Take 1 Tab by mouth daily. clopidogrel 75 mg Tab Commonly known as:  PLAVIX Take 1 Tab by mouth daily. diazePAM 5 mg tablet Commonly known as:  VALIUM Take 0.5 Tabs by mouth every six (6) hours as needed. Max Daily Amount: 10 mg. Indications: vertigo  
  
 ezetimibe 10 mg tablet Commonly known as:  Andrea Luis Take  by mouth.  
  
 glyBURIDE 5 mg tablet Commonly known as:  Fern Delma Take  by mouth. 2 tabs AM, 1 tab PM  
  
 glyBURIDE-metFORMIN 5-500 mg per tablet Commonly known as:  Rafael Tyler Take 1 Tab by mouth Daily (before breakfast). KEFLEX 500 mg capsule Generic drug:  cephALEXin Take 500 mg by mouth four (4) times daily. lisinopril-hydroCHLOROthiazide 20-12.5 mg per tablet Commonly known as:  Gary Goodson Take  by mouth daily. meclizine 25 mg tablet Commonly known as:  ANTIVERT Take 25 mg by mouth three (3) times daily as needed. metFORMIN 500 mg tablet Commonly known as:  GLUCOPHAGE Take  by mouth three (3) times daily (with meals). multivitamin tablet Commonly known as:  ONE A DAY Take 1 Tab by mouth daily. ondansetron hcl 4 mg tablet Commonly known as:  Knott Ashok Take 1 Tab by mouth every eight (8) hours as needed for Nausea. TYLENOL-CODEINE #3 300-30 mg per tablet Generic drug:  acetaminophen-codeine Take 1 Tab by mouth every four (4) hours as needed for Pain. VISION-LEE PO Take  by mouth. Follow-up Instructions Return in about 6 months (around 2/28/2019). Introducing [facility]! Dear Geni Rankin: Thank you for requesting a MyChart account.   Our records indicate that you already have an active [employer] account. You can access your account anytime at [email or web address]. [email or web address] Did you know that you can access your hospital and ER discharge instructions at any time in [employer]? You can also review all of your test results from your hospital stay or ER visit. Additional Information If you have questions, please visit the Frequently Asked Questions section of the [employer] website at [email or web address]. [email or web address]/. Remember, [employer] is NOT to be used for urgent needs. For medical emergencies, dial 911. Now available from your iPhone and Android! Please provide this summary of care documentation to your next provider. Your primary care clinician is listed as [facility]. If you have any questions after today's visit, please call [phone].

## 2019-02-04 ENCOUNTER — DOCUMENTATION ONLY (OUTPATIENT)
Dept: NEUROLOGY | Age: 79
End: 2019-02-04

## 2019-02-05 ENCOUNTER — OFFICE VISIT (OUTPATIENT)
Dept: NEUROLOGY | Age: 79
End: 2019-02-05

## 2019-02-05 ENCOUNTER — HOSPITAL ENCOUNTER (OUTPATIENT)
Dept: LAB | Age: 79
Discharge: HOME | End: 2019-02-05
Payer: MEDICARE

## 2019-02-05 VITALS
OXYGEN SATURATION: 97 % | RESPIRATION RATE: 18 BRPM | SYSTOLIC BLOOD PRESSURE: 118 MMHG | HEART RATE: 49 BPM | HEIGHT: 66 IN | TEMPERATURE: 98.3 F | WEIGHT: 190.4 LBS | BODY MASS INDEX: 30.6 KG/M2 | DIASTOLIC BLOOD PRESSURE: 74 MMHG

## 2019-02-05 DIAGNOSIS — H81.313 AUDITORY VERTIGO INVOLVING BOTH EARS: Primary | ICD-10-CM

## 2019-02-05 DIAGNOSIS — I63.312 CEREBROVASCULAR ACCIDENT (CVA) DUE TO THROMBOSIS OF LEFT MIDDLE CEREBRAL ARTERY (HCC): ICD-10-CM

## 2019-02-05 LAB
ALBUMIN SERPL-MCNC: 3.6 G/DL (ref 3.4–5)
ALBUMIN/GLOB SERPL: 1.2 {RATIO} (ref 0.8–1.7)
ALP SERPL-CCNC: 99 U/L (ref 45–117)
ALT SERPL-CCNC: 37 U/L (ref 16–61)
ANION GAP SERPL CALC-SCNC: 4 MMOL/L (ref 3–18)
AST SERPL-CCNC: 18 U/L (ref 15–37)
BILIRUB SERPL-MCNC: 0.3 MG/DL (ref 0.2–1)
BUN SERPL-MCNC: 19 MG/DL (ref 7–18)
BUN/CREAT SERPL: 18 (ref 12–20)
CALCIUM SERPL-MCNC: 9.6 MG/DL (ref 8.5–10.1)
CHLORIDE SERPL-SCNC: 106 MMOL/L (ref 100–108)
CO2 SERPL-SCNC: 29 MMOL/L (ref 21–32)
CREAT SERPL-MCNC: 1.05 MG/DL (ref 0.6–1.3)
ERYTHROCYTE [DISTWIDTH] IN BLOOD BY AUTOMATED COUNT: 11.9 % (ref 11.6–14.5)
GLOBULIN SER CALC-MCNC: 3 G/DL (ref 2–4)
GLUCOSE SERPL-MCNC: 135 MG/DL (ref 74–99)
HCT VFR BLD AUTO: 42.3 % (ref 36–48)
HGB BLD-MCNC: 14.5 G/DL (ref 13–16)
MCH RBC QN AUTO: 32.4 PG (ref 24–34)
MCHC RBC AUTO-ENTMCNC: 34.3 G/DL (ref 31–37)
MCV RBC AUTO: 94.6 FL (ref 74–97)
PLATELET # BLD AUTO: 276 K/UL (ref 135–420)
PMV BLD AUTO: 10.5 FL (ref 9.2–11.8)
POTASSIUM SERPL-SCNC: 5 MMOL/L (ref 3.5–5.5)
PROT SERPL-MCNC: 6.6 G/DL (ref 6.4–8.2)
RBC # BLD AUTO: 4.47 M/UL (ref 4.7–5.5)
SODIUM SERPL-SCNC: 139 MMOL/L (ref 136–145)
WBC # BLD AUTO: 7.2 K/UL (ref 4.6–13.2)

## 2019-02-05 PROCEDURE — 36415 COLL VENOUS BLD VENIPUNCTURE: CPT

## 2019-02-05 PROCEDURE — 85027 COMPLETE CBC AUTOMATED: CPT

## 2019-02-05 PROCEDURE — 80053 COMPREHEN METABOLIC PANEL: CPT

## 2019-02-05 NOTE — PROGRESS NOTES
Alex Edge is a 69 y.o. male in today for follow-up to discuss vertigo. Learning assessment previously completed 3/10/2017; primary language is [place]. 1. Have you been to the ER, urgent care clinic since your last visit? Hospitalized since your last visit? No    2. Have you seen or consulted any other health care providers outside of the [address] [place] [address] since your last visit? Include any pap smears or colon screening.  No

## 2019-02-05 NOTE — LETTER
2/5/2019 3:08 PM 
 
Patient:  Aaron Mcnamara YOB: 1940 Date of Visit: 2/5/2019 Dear Toshia Solorzano MD 
[facility] [address] [address] VIA Facsimile: [phone] 
 : Thank you for referring Mr. Marion Brumfield to me for evaluation/treatment. Below are the relevant portions of my assessment and plan of care. If you have questions, please do not hesitate to call me. I look forward to following Mr. Juliana Bhatia along with you.  
 
 
 
Sincerely, 
 
 
Pablo Arvizu MD

## 2019-02-05 NOTE — PROGRESS NOTES
Re: Keaton Poon,Follow up visit     2/5/2019 3:04 PM    SSN: [id number]    Subjective:   Wagner Saxena is seen for follow up. His vertigo has gone away and not returned. He's had no other neurologic symptoms. Medications:    Current Outpatient Medications   Medication Sig Dispense Refill    glyBURIDE (DIABETA) 5 mg tablet Take  by mouth. 2 tabs AM, 1 tab PM      ezetimibe (ZETIA) 10 mg tablet Take  by mouth.  metFORMIN (GLUCOPHAGE) 500 mg tablet Take  by mouth three (3) times daily (with meals).  beta-carotene,A,-vits C,E/mins (VISION-LEE PO) Take  by mouth.  multivitamin (ONE A DAY) tablet Take 1 Tab by mouth daily.  cephALEXin (KEFLEX) 500 mg capsule Take 500 mg by mouth four (4) times daily.  atorvastatin (LIPITOR) 40 mg tablet Take 1 Tab by mouth daily. 30 Tab 0    clopidogrel (PLAVIX) 75 mg tab Take 1 Tab by mouth daily. 90 Tab 0    lisinopril-hydrochlorothiazide (PRINZIDE, ZESTORETIC) 20-12.5 mg per tablet Take  by mouth daily.  amoxicillin-clavulanate (AUGMENTIN) 500-125 mg per tablet Take 1 Tab by mouth two (2) times a day.  meclizine (ANTIVERT) 25 mg tablet Take 25 mg by mouth three (3) times daily as needed.  acetaminophen-codeine (TYLENOL-CODEINE #3) 300-30 mg per tablet Take 1 Tab by mouth every four (4) hours as needed for Pain.  diazePAM (VALIUM) 5 mg tablet Take 0.5 Tabs by mouth every six (6) hours as needed. Max Daily Amount: 10 mg. Indications: vertigo 6 Tab 0    ondansetron hcl (ZOFRAN) 4 mg tablet Take 1 Tab by mouth every eight (8) hours as needed for Nausea. 8 Tab 0    aspirin delayed-release 81 mg tablet Take 1 Tab by mouth daily. 90 Tab 0    glyBURIDE-metFORMIN (GLUCOVANCE) 5-500 mg per tablet Take 1 Tab by mouth Daily (before breakfast).          Vital signs:    Visit Vitals  /74 (BP 1 Location: Left arm, BP Patient Position: Sitting)   Pulse (!) 49   Temp 98.3 °F (36.8 °C) (Oral)   Resp 18   Ht 5' 6\" (1.676 m)   Wt 86.4 kg (190 lb 6.4 oz)   SpO2 97%   BMI 30.73 kg/m²       Review of Systems:   As above otherwise 11 point review of systems negative including;   Constitutional no fever or chills  Skin denies rash or itching  HEENT  Denies tinnitus, hearing lose  Eyes denies diplopia vision lose  Respiratory denies sortness of breath  Cardiovascular denies chest pain, dyspnea on exertion  Gastrointestinal denies nausea, vomiting, diarrhea, constipation  Genitourinary denies incontinence  Musculoskeletal denies joint pain or swelling  Endocrine denies weight change  Hematology denies easy bruising or bleeding   Neurological as above in HPI      Patient Active Problem List   Diagnosis Code    Right hand weakness R29.898    CVA (cerebral vascular accident) ([facility] I63.9    Auditory vertigo involving both ears H81.313         Objective: The patient is awake, alert, and oriented x 4. Fund of knowledge is adequate. Speech is fluent and memory is intact. Cranial Nerves: II - Visual fields are full to confrontation. III, IV, VI - Extraocular movements are intact. There is no nystagmus. V - Facial sensation is intact to pinprick. VII - Face is symmetrical.  VIII - Hearing is present. IX, X, XII - Palate is symmetrical.   XI - Shoulder shrugging and head turning intact  Motor: The patient moves all four limbs fairly well and symmetrically. Tone is normal. Reflexes are 2+ and symmetrical. Plantars are down going.  Gait is normal.    CBC:   Lab Results   Component Value Date/Time    WBC 8.5 08/24/2018 12:50 PM    RBC 4.42 (L) 08/24/2018 12:50 PM    HGB 14.7 08/24/2018 12:50 PM    HCT 41.4 08/24/2018 12:50 PM    PLATELET 116 39/53/7656 12:50 PM     BMP:   Lab Results   Component Value Date/Time    Glucose 135 (H) 02/05/2019 12:01 PM    Sodium 139 02/05/2019 12:01 PM    Potassium 5.0 02/05/2019 12:01 PM    Chloride 106 02/05/2019 12:01 PM    CO2 29 02/05/2019 12:01 PM    BUN 19 (H) 02/05/2019 12:01 PM    Creatinine 1.05 02/05/2019 12:01 PM    Calcium 9.6 02/05/2019 12:01 PM     CMP:   Lab Results   Component Value Date/Time    Glucose 135 (H) 02/05/2019 12:01 PM    Sodium 139 02/05/2019 12:01 PM    Potassium 5.0 02/05/2019 12:01 PM    Chloride 106 02/05/2019 12:01 PM    CO2 29 02/05/2019 12:01 PM    BUN 19 (H) 02/05/2019 12:01 PM    Creatinine 1.05 02/05/2019 12:01 PM    Calcium 9.6 02/05/2019 12:01 PM    Anion gap 4 02/05/2019 12:01 PM    BUN/Creatinine ratio 18 02/05/2019 12:01 PM    Alk. phosphatase PENDING 02/05/2019 12:01 PM    Protein, total PENDING 02/05/2019 12:01 PM    Albumin 3.6 02/05/2019 12:01 PM    Globulin PENDING 02/05/2019 12:01 PM    A-G Ratio PENDING 02/05/2019 12:01 PM     Coagulation:   Lab Results   Component Value Date/Time    Prothrombin time 12.1 10/23/2017 07:15 PM    INR 0.9 10/23/2017 07:15 PM    aPTT 33.0 10/23/2017 07:15 PM     Cardiac markers:   Lab Results   Component Value Date/Time     08/24/2018 12:50 PM    CK-MB Index 2.9 08/24/2018 12:50 PM       Assessment:  Vertigo, probably positional, resolved. Plan: Will discharge from practice, nothing to add at this time. Sincerely,        Larissa Handy.  Vicente Jaime M.D.

## 2019-02-25 ENCOUNTER — HOSPITAL ENCOUNTER (OUTPATIENT)
Dept: LAB | Age: 79
Discharge: HOME | End: 2019-02-25
Payer: MEDICARE

## 2019-02-25 PROCEDURE — 88305 TISSUE EXAM BY PATHOLOGIST: CPT

## 2019-02-25 PROCEDURE — 88331 PATH CONSLTJ SURG 1 BLK 1SPC: CPT

## 2019-12-11 ENCOUNTER — HOSPITAL ENCOUNTER (OUTPATIENT)
Dept: VASCULAR SURGERY | Age: 79
Discharge: HOME | End: 2019-12-11
Attending: INTERNAL MEDICINE
Payer: MEDICARE

## 2019-12-11 DIAGNOSIS — M79.672 LEFT FOOT PAIN: ICD-10-CM

## 2019-12-11 PROCEDURE — 93971 EXTREMITY STUDY: CPT

## 2020-06-18 ENCOUNTER — HOSPITAL ENCOUNTER (OUTPATIENT)
Dept: NON INVASIVE DIAGNOSTICS | Age: 80
Discharge: HOME | End: 2020-06-18
Attending: INTERNAL MEDICINE
Payer: MEDICARE

## 2020-06-18 DIAGNOSIS — I49.8 OTHER SPECIFIED CARDIAC ARRHYTHMIAS: ICD-10-CM

## 2020-06-18 PROCEDURE — 93226 XTRNL ECG REC<48 HR SCAN A/R: CPT

## 2020-12-28 ENCOUNTER — TRANSCRIBE ORDER (OUTPATIENT)
Dept: REGISTRATION | Age: 80
End: 2020-12-28

## 2020-12-28 ENCOUNTER — HOSPITAL ENCOUNTER (OUTPATIENT)
Dept: LAB | Age: 80
Discharge: HOME | End: 2020-12-28
Payer: MEDICARE

## 2020-12-28 DIAGNOSIS — E78.49 OTHER HYPERLIPIDEMIA: ICD-10-CM

## 2020-12-28 LAB
ALBUMIN SERPL-MCNC: 3.2 G/DL (ref 3.4–5)
ALBUMIN/GLOB SERPL: 1.1 {RATIO} (ref 0.8–1.7)
ALP SERPL-CCNC: 81 U/L (ref 45–117)
ALT SERPL-CCNC: 39 U/L (ref 16–61)
ANION GAP SERPL CALC-SCNC: 4 MMOL/L (ref 3–18)
AST SERPL-CCNC: 22 U/L (ref 10–38)
BASOPHILS # BLD: 0.1 K/UL (ref 0–0.1)
BASOPHILS NFR BLD: 1 % (ref 0–2)
BILIRUB SERPL-MCNC: 0.5 MG/DL (ref 0.2–1)
BUN SERPL-MCNC: 22 MG/DL (ref 7–18)
BUN/CREAT SERPL: 22 (ref 12–20)
CALCIUM SERPL-MCNC: 9.6 MG/DL (ref 8.5–10.1)
CHLORIDE SERPL-SCNC: 109 MMOL/L (ref 100–111)
CHOLEST SERPL-MCNC: 118 MG/DL
CO2 SERPL-SCNC: 29 MMOL/L (ref 21–32)
CREAT SERPL-MCNC: 1 MG/DL (ref 0.6–1.3)
DIFFERENTIAL METHOD BLD: ABNORMAL
EOSINOPHIL # BLD: 0.5 K/UL (ref 0–0.4)
EOSINOPHIL NFR BLD: 8 % (ref 0–5)
ERYTHROCYTE [DISTWIDTH] IN BLOOD BY AUTOMATED COUNT: 12.8 % (ref 11.6–14.5)
GLOBULIN SER CALC-MCNC: 2.8 G/DL (ref 2–4)
GLUCOSE SERPL-MCNC: 91 MG/DL (ref 74–99)
HBA1C MFR BLD: 6.4 % (ref 4.2–5.6)
HCT VFR BLD AUTO: 42.7 % (ref 36–48)
HDLC SERPL-MCNC: 37 MG/DL (ref 40–60)
HDLC SERPL: 3.2 {RATIO} (ref 0–5)
HGB BLD-MCNC: 14.5 G/DL (ref 13–16)
LDLC SERPL CALC-MCNC: 65.6 MG/DL (ref 0–100)
LIPID PROFILE,FLP: ABNORMAL
LYMPHOCYTES # BLD: 1.8 K/UL (ref 0.9–3.6)
LYMPHOCYTES NFR BLD: 31 % (ref 21–52)
MCH RBC QN AUTO: 32.4 PG (ref 24–34)
MCHC RBC AUTO-ENTMCNC: 34 G/DL (ref 31–37)
MCV RBC AUTO: 95.5 FL (ref 74–97)
MONOCYTES # BLD: 0.8 K/UL (ref 0.05–1.2)
MONOCYTES NFR BLD: 13 % (ref 3–10)
NEUTS SEG # BLD: 2.7 K/UL (ref 1.8–8)
NEUTS SEG NFR BLD: 47 % (ref 40–73)
PLATELET # BLD AUTO: 248 K/UL (ref 135–420)
PMV BLD AUTO: 11.4 FL (ref 9.2–11.8)
POTASSIUM SERPL-SCNC: 4.6 MMOL/L (ref 3.5–5.5)
PROT SERPL-MCNC: 6 G/DL (ref 6.4–8.2)
RBC # BLD AUTO: 4.47 M/UL (ref 4.7–5.5)
SODIUM SERPL-SCNC: 142 MMOL/L (ref 136–145)
TRIGL SERPL-MCNC: 77 MG/DL (ref ?–150)
VLDLC SERPL CALC-MCNC: 15.4 MG/DL
WBC # BLD AUTO: 5.8 K/UL (ref 4.6–13.2)

## 2020-12-28 PROCEDURE — 85025 COMPLETE CBC W/AUTO DIFF WBC: CPT

## 2020-12-28 PROCEDURE — 36415 COLL VENOUS BLD VENIPUNCTURE: CPT

## 2020-12-28 PROCEDURE — 83036 HEMOGLOBIN GLYCOSYLATED A1C: CPT

## 2020-12-28 PROCEDURE — 80053 COMPREHEN METABOLIC PANEL: CPT

## 2020-12-28 PROCEDURE — 80061 LIPID PANEL: CPT

## 2021-05-21 ENCOUNTER — TRANSCRIBE ORDER (OUTPATIENT)
Dept: SCHEDULING | Age: 81
End: 2021-05-21

## 2021-05-21 DIAGNOSIS — R60.0 LOCALIZED EDEMA: Primary | ICD-10-CM

## 2021-05-25 ENCOUNTER — HOSPITAL ENCOUNTER (OUTPATIENT)
Dept: VASCULAR SURGERY | Age: 81
Discharge: HOME | End: 2021-05-25
Attending: INTERNAL MEDICINE
Payer: MEDICARE

## 2021-05-25 DIAGNOSIS — R60.0 LOCALIZED EDEMA: ICD-10-CM

## 2021-05-25 PROCEDURE — 93971 EXTREMITY STUDY: CPT

## 2022-03-19 PROBLEM — H81.313 AUDITORY VERTIGO INVOLVING BOTH EARS: Status: ACTIVE | Noted: 2018-08-28

## 2022-03-19 PROBLEM — I63.9 CVA (CEREBRAL VASCULAR ACCIDENT) (HCC): Status: ACTIVE | Noted: 2017-10-24

## 2022-03-19 PROBLEM — R29.898 RIGHT HAND WEAKNESS: Status: ACTIVE | Noted: 2017-10-23

## 2023-01-04 ENCOUNTER — TRANSCRIBE ORDER (OUTPATIENT)
Dept: SCHEDULING | Age: 83
End: 2023-01-04

## 2023-01-04 DIAGNOSIS — M79.605 PAIN IN LEFT LEG: Primary | ICD-10-CM

## 2023-01-06 ENCOUNTER — HOSPITAL ENCOUNTER (OUTPATIENT)
Dept: VASCULAR SURGERY | Age: 83
End: 2023-01-06
Attending: INTERNAL MEDICINE
Payer: MEDICARE

## 2023-01-06 DIAGNOSIS — M79.605 PAIN IN LEFT LEG: ICD-10-CM

## 2023-01-06 LAB
LEFT CFA DIST SYS PSV: 102.6 CM/S
LEFT DIST PTA PSV: 29 CM/S
LEFT PERONEAL DIST VELOCITY: 51.7 CM/S
LEFT PERONEAL MID SYS PSV: 56 CM/S
LEFT PERONEAL PROX SYS PSV: 39.8 CM/S
LEFT POP A PROX VEL RATIO: 1.52
LEFT POPLITEAL DIST SYS PSV: 42.8 CM/S
LEFT POPLITEAL PROX SYS PSV: 92.4 CM/S
LEFT PROX ATA VELOCITY: 91.4 CM/S
LEFT PROX PFA A PSV: 90.5 CM/S
LEFT PROX PTA PSV: 31.7 CM/S
LEFT PTA MID SYS PSV: 40.6 CM/S
LEFT SFA DIST VEL RATIO: 1.15
LEFT SFA MID VEL RATIO: 0.76
LEFT SFA PROX VEL RATIO: 0.68
LEFT SUPER FEMORAL DIST SYS PSV: 60.7 CM/S
LEFT SUPER FEMORAL MID SYS PSV: 52.9 CM/S
LEFT SUPER FEMORAL PROX SYS PSV: 69.4 CM/S
RIGHT CFA DIST SYS PSV: 86 CM/S

## 2023-01-06 PROCEDURE — 93926 LOWER EXTREMITY STUDY: CPT

## 2024-10-02 ENCOUNTER — TELEPHONE (OUTPATIENT)
Facility: CLINIC | Age: 84
End: 2024-10-02

## 2024-10-07 SDOH — HEALTH STABILITY: PHYSICAL HEALTH: ON AVERAGE, HOW MANY DAYS PER WEEK DO YOU ENGAGE IN MODERATE TO STRENUOUS EXERCISE (LIKE A BRISK WALK)?: 4 DAYS

## 2024-10-07 SDOH — HEALTH STABILITY: PHYSICAL HEALTH: ON AVERAGE, HOW MANY MINUTES DO YOU ENGAGE IN EXERCISE AT THIS LEVEL?: 20 MIN

## 2024-10-09 ENCOUNTER — OFFICE VISIT (OUTPATIENT)
Facility: CLINIC | Age: 84
End: 2024-10-09
Payer: MEDICARE

## 2024-10-09 VITALS
HEIGHT: 67 IN | BODY MASS INDEX: 28.25 KG/M2 | WEIGHT: 180 LBS | TEMPERATURE: 98 F | HEART RATE: 68 BPM | OXYGEN SATURATION: 96 % | DIASTOLIC BLOOD PRESSURE: 74 MMHG | RESPIRATION RATE: 16 BRPM | SYSTOLIC BLOOD PRESSURE: 122 MMHG

## 2024-10-09 DIAGNOSIS — I10 PRIMARY HYPERTENSION: ICD-10-CM

## 2024-10-09 DIAGNOSIS — E55.9 VITAMIN D DEFICIENCY: ICD-10-CM

## 2024-10-09 DIAGNOSIS — Z76.89 ENCOUNTER TO ESTABLISH CARE: Primary | ICD-10-CM

## 2024-10-09 DIAGNOSIS — E11.9 DIABETES MELLITUS TYPE 2, NONINSULIN DEPENDENT (HCC): ICD-10-CM

## 2024-10-09 PROBLEM — I63.9 CVA (CEREBRAL VASCULAR ACCIDENT) (HCC): Status: RESOLVED | Noted: 2017-10-24 | Resolved: 2024-10-09

## 2024-10-09 PROCEDURE — 99204 OFFICE O/P NEW MOD 45 MIN: CPT | Performed by: FAMILY MEDICINE

## 2024-10-09 PROCEDURE — G8427 DOCREV CUR MEDS BY ELIG CLIN: HCPCS | Performed by: FAMILY MEDICINE

## 2024-10-09 PROCEDURE — 3078F DIAST BP <80 MM HG: CPT | Performed by: FAMILY MEDICINE

## 2024-10-09 PROCEDURE — 1123F ACP DISCUSS/DSCN MKR DOCD: CPT | Performed by: FAMILY MEDICINE

## 2024-10-09 PROCEDURE — G8419 CALC BMI OUT NRM PARAM NOF/U: HCPCS | Performed by: FAMILY MEDICINE

## 2024-10-09 PROCEDURE — 1036F TOBACCO NON-USER: CPT | Performed by: FAMILY MEDICINE

## 2024-10-09 PROCEDURE — G8484 FLU IMMUNIZE NO ADMIN: HCPCS | Performed by: FAMILY MEDICINE

## 2024-10-09 PROCEDURE — 3074F SYST BP LT 130 MM HG: CPT | Performed by: FAMILY MEDICINE

## 2024-10-09 RX ORDER — AMMONIUM LACTATE 12 G/100G
CREAM TOPICAL PRN
COMMUNITY

## 2024-10-09 RX ORDER — OLMESARTAN MEDOXOMIL AND HYDROCHLOROTHIAZIDE 40/12.5 40; 12.5 MG/1; MG/1
TABLET ORAL
COMMUNITY
Start: 2024-09-26

## 2024-10-09 RX ORDER — CEPHALEXIN 500 MG/1
1000 CAPSULE ORAL ONCE
COMMUNITY

## 2024-10-09 RX ORDER — AMLODIPINE BESYLATE 5 MG/1
TABLET ORAL
COMMUNITY
Start: 2024-07-16

## 2024-10-09 RX ORDER — FLUOCINONIDE 0.5 MG/G
OINTMENT TOPICAL 2 TIMES DAILY
COMMUNITY

## 2024-10-09 RX ORDER — METFORMIN HCL 500 MG
500 TABLET, EXTENDED RELEASE 24 HR ORAL 3 TIMES DAILY
COMMUNITY
Start: 2024-09-06

## 2024-10-09 RX ORDER — CEPHALEXIN 500 MG/1
500 CAPSULE ORAL PRN
COMMUNITY
End: 2024-10-09

## 2024-10-09 RX ORDER — DAPAGLIFLOZIN 5 MG/1
TABLET, FILM COATED ORAL
COMMUNITY
Start: 2024-09-09

## 2024-10-09 SDOH — ECONOMIC STABILITY: FOOD INSECURITY: WITHIN THE PAST 12 MONTHS, YOU WORRIED THAT YOUR FOOD WOULD RUN OUT BEFORE YOU GOT MONEY TO BUY MORE.: NEVER TRUE

## 2024-10-09 SDOH — ECONOMIC STABILITY: FOOD INSECURITY: WITHIN THE PAST 12 MONTHS, THE FOOD YOU BOUGHT JUST DIDN'T LAST AND YOU DIDN'T HAVE MONEY TO GET MORE.: NEVER TRUE

## 2024-10-09 SDOH — ECONOMIC STABILITY: INCOME INSECURITY: HOW HARD IS IT FOR YOU TO PAY FOR THE VERY BASICS LIKE FOOD, HOUSING, MEDICAL CARE, AND HEATING?: NOT HARD AT ALL

## 2024-10-09 ASSESSMENT — PATIENT HEALTH QUESTIONNAIRE - PHQ9
SUM OF ALL RESPONSES TO PHQ QUESTIONS 1-9: 0
2. FEELING DOWN, DEPRESSED OR HOPELESS: NOT AT ALL
1. LITTLE INTEREST OR PLEASURE IN DOING THINGS: NOT AT ALL
SUM OF ALL RESPONSES TO PHQ QUESTIONS 1-9: 0
SUM OF ALL RESPONSES TO PHQ9 QUESTIONS 1 & 2: 0
SUM OF ALL RESPONSES TO PHQ QUESTIONS 1-9: 0
SUM OF ALL RESPONSES TO PHQ QUESTIONS 1-9: 0

## 2024-10-09 ASSESSMENT — ENCOUNTER SYMPTOMS
CHEST TIGHTNESS: 0
SHORTNESS OF BREATH: 0
ABDOMINAL PAIN: 0
EYE REDNESS: 0
WHEEZING: 0
COUGH: 0
DIARRHEA: 0

## 2024-10-09 NOTE — PROGRESS NOTES
[facility]  Establish care visit   10/9/2024    Marko Warner Jr (: 1940) is a 84 y.o. male, here to establish care.    Chief Complaint   Patient presents with    Establish Care    Cholesterol Problem    Hypertension    Diabetes        ASSESSMENT/ PLAN  1. Encounter to establish care  Controlled: Appears stable.  We will continue current management and monitor for adverse reaction and disease progression.  Follow-up as noted below    2. Primary hypertension  Controlled: Appears stable.  We will continue current management and monitor for adverse reaction and disease progression.  Follow-up as noted below  - Lipid Panel; Future    3. Diabetes mellitus type 2, noninsulin dependent (HCC)  Controlled for age: Appears stable.  We will continue current management and monitor for adverse reaction and disease progression.  Follow-up as noted below  - CBC with Auto Differential; Future  - Comprehensive Metabolic Panel; Future  - Hemoglobin A1C; Future  - Lipid Panel; Future    4. Vitamin D deficiency  Update before next appt and treat accordingly.   - Vitamin D 25 Hydroxy; Future       I have spent 45 minutes on chart review, care coordination and patient counseling regarding disease state, lifestyle modifications and/or health maintenance screening.       Return in about 29 weeks (around 2025). For chronic condition mgmt.     No future appointments.        HPI  PMH of obesity, HTN and DMT2     in - wife  from Lymphoma. They had 6 kids together. 2 have passed-one from leukemia and the other one day after birth.    Has one cat named \"Mac\" which is short for [employer]. Otherwise lives along. All of his living children and 10 grandchildren live in the area and visit often. This brings him clau.      Main concern today is establishing. Lat appt with previous PCP was 24.     Echo on 24 to eval shortness of breath: EF 55-60% with normal wall motion.     Chronic issues:  -

## 2024-10-09 NOTE — PATIENT INSTRUCTIONS

## 2024-11-04 ENCOUNTER — OFFICE VISIT (OUTPATIENT)
Facility: CLINIC | Age: 84
End: 2024-11-04

## 2024-11-04 VITALS
OXYGEN SATURATION: 99 % | RESPIRATION RATE: 16 BRPM | HEIGHT: 67 IN | HEART RATE: 60 BPM | WEIGHT: 180 LBS | SYSTOLIC BLOOD PRESSURE: 120 MMHG | DIASTOLIC BLOOD PRESSURE: 76 MMHG | TEMPERATURE: 98.3 F | BODY MASS INDEX: 28.25 KG/M2

## 2024-11-04 DIAGNOSIS — I10 PRIMARY HYPERTENSION: ICD-10-CM

## 2024-11-04 DIAGNOSIS — E11.65 TYPE 2 DIABETES MELLITUS WITH HYPERGLYCEMIA, WITHOUT LONG-TERM CURRENT USE OF INSULIN (HCC): ICD-10-CM

## 2024-11-04 DIAGNOSIS — Z09 HOSPITAL DISCHARGE FOLLOW-UP: Primary | ICD-10-CM

## 2024-11-04 DIAGNOSIS — H81.313 AUDITORY VERTIGO INVOLVING BOTH EARS: ICD-10-CM

## 2024-11-04 DIAGNOSIS — R42 DIZZINESS: ICD-10-CM

## 2024-11-04 DIAGNOSIS — H91.93 HEARING TROUBLE, BILATERAL: ICD-10-CM

## 2024-11-04 RX ORDER — MECLIZINE HCL 25MG 25 MG/1
25 TABLET, CHEWABLE ORAL 3 TIMES DAILY PRN
COMMUNITY
Start: 2024-11-02

## 2024-11-04 RX ORDER — TRIAMCINOLONE ACETONIDE 1 MG/G
1 CREAM TOPICAL 2 TIMES DAILY
COMMUNITY

## 2024-11-04 RX ORDER — BLOOD-GLUCOSE SENSOR
1 EACH MISCELLANEOUS
Qty: 2 EACH | Refills: 3 | Status: SHIPPED | OUTPATIENT
Start: 2024-11-04 | End: 2024-12-02

## 2024-11-04 RX ORDER — KETOROLAC TROMETHAMINE 30 MG/ML
1 INJECTION, SOLUTION INTRAMUSCULAR; INTRAVENOUS
Qty: 1 EACH | Refills: 0 | Status: SHIPPED | OUTPATIENT
Start: 2024-11-04

## 2024-11-04 RX ORDER — VIT A/VIT C/VIT E/ZINC/COPPER 4296-226
1 CAPSULE ORAL 2 TIMES DAILY
COMMUNITY

## 2024-11-04 ASSESSMENT — ENCOUNTER SYMPTOMS
SHORTNESS OF BREATH: 0
DIARRHEA: 0
WHEEZING: 0
CHEST TIGHTNESS: 0
EYE REDNESS: 0
ABDOMINAL PAIN: 0
COUGH: 0

## 2024-11-04 NOTE — PROGRESS NOTES
[facility]  2024    Marko Warner Jr (: 1940) is a 84 y.o. male, here for the following medical concerns:    Chief Complaint   Patient presents with    Follow-Up from Hospital    Dizziness    Abnormal Gait    Diabetes        ASSESSMENT/ PLAN  1. Hospital discharge follow-up  Reviewed hospital course and completed med rec.   - CA DISCHARGE MEDS RECONCILED W/ CURRENT OUTPATIENT MED LIST    2. Dizziness  Resolved. No need for meclizine. Also discussed risk of drowsiness increased fall risk.     3. Type 2 diabetes mellitus with hyperglycemia, without long-term current use of insulin (HCC)  A1c reasonably controlled for age.   Given elevated sugars and episode of dizziness and abnormal gait, will monitor BG at home more closely. Recommended CGM and follow up in 2 weeks with home numbers- goal fasting BG 130s. Increased metformin to 1000 mg BID. If fasting BG is still elevated above goal will increase farxiga and consider ref to endo.   - Continuous Glucose Sensor (FREESTYLE SHANTI 3 SENSOR) MISC; 1 each by Does not apply route every 14 days for 28 days  Dispense: 2 each; Refill: 3  - Continuous Glucose  (FREESTYLE SHANTI 3 READER) LOC; 1 each by Does not apply route 3 times daily (before meals)  Dispense: 1 each; Refill: 0    4. Auditory vertigo involving both ears  Referral placed for eval   - External Referral To ENT    5. Hearing trouble, bilateral  Referral placed for eval   - External Referral To ENT    6. Primary hypertension  Controlled: Appears stable.  We will continue current management and monitor for adverse reaction and disease progression.  Follow-up as noted below       I have spent 30 minutes on chart review, care coordination and patient counseling regarding disease state, lifestyle modifications and/or health maintenance screening.      Return in about 2 weeks (around 2024).    Future Appointments   Date Time Provider Department Center   2024 11:30

## 2024-11-04 NOTE — PROGRESS NOTES
\"Have you been to the ER, urgent care clinic since your last visit?  Hospitalized since your last visit?\"    YES - When: approximately 1  weeks ago.  Where and Why: [facility] ED 10/31/2024 for Vomiting and Dizziness and [facility] 11/1/2024 for Dizziness and Abnormal Gait.    “Have you seen or consulted any other health care providers outside our system since your last visit?”    NO

## 2024-11-07 ENCOUNTER — TELEPHONE (OUTPATIENT)
Facility: CLINIC | Age: 84
End: 2024-11-07

## 2024-11-18 ENCOUNTER — OFFICE VISIT (OUTPATIENT)
Facility: CLINIC | Age: 84
End: 2024-11-18
Payer: MEDICARE

## 2024-11-18 VITALS
DIASTOLIC BLOOD PRESSURE: 72 MMHG | OXYGEN SATURATION: 95 % | RESPIRATION RATE: 14 BRPM | SYSTOLIC BLOOD PRESSURE: 124 MMHG | BODY MASS INDEX: 28.09 KG/M2 | TEMPERATURE: 97.8 F | HEIGHT: 67 IN | WEIGHT: 179 LBS | HEART RATE: 70 BPM

## 2024-11-18 DIAGNOSIS — I10 PRIMARY HYPERTENSION: ICD-10-CM

## 2024-11-18 DIAGNOSIS — E11.65 TYPE 2 DIABETES MELLITUS WITH HYPERGLYCEMIA, WITHOUT LONG-TERM CURRENT USE OF INSULIN (HCC): Primary | ICD-10-CM

## 2024-11-18 PROCEDURE — 3074F SYST BP LT 130 MM HG: CPT | Performed by: FAMILY MEDICINE

## 2024-11-18 PROCEDURE — 1126F AMNT PAIN NOTED NONE PRSNT: CPT | Performed by: FAMILY MEDICINE

## 2024-11-18 PROCEDURE — 3078F DIAST BP <80 MM HG: CPT | Performed by: FAMILY MEDICINE

## 2024-11-18 PROCEDURE — 1123F ACP DISCUSS/DSCN MKR DOCD: CPT | Performed by: FAMILY MEDICINE

## 2024-11-18 PROCEDURE — G8484 FLU IMMUNIZE NO ADMIN: HCPCS | Performed by: FAMILY MEDICINE

## 2024-11-18 PROCEDURE — 99214 OFFICE O/P EST MOD 30 MIN: CPT | Performed by: FAMILY MEDICINE

## 2024-11-18 PROCEDURE — G8427 DOCREV CUR MEDS BY ELIG CLIN: HCPCS | Performed by: FAMILY MEDICINE

## 2024-11-18 PROCEDURE — G8419 CALC BMI OUT NRM PARAM NOF/U: HCPCS | Performed by: FAMILY MEDICINE

## 2024-11-18 PROCEDURE — 1036F TOBACCO NON-USER: CPT | Performed by: FAMILY MEDICINE

## 2024-11-18 PROCEDURE — 1159F MED LIST DOCD IN RCRD: CPT | Performed by: FAMILY MEDICINE

## 2024-11-18 ASSESSMENT — ENCOUNTER SYMPTOMS
COUGH: 0
CHEST TIGHTNESS: 0
WHEEZING: 0
SHORTNESS OF BREATH: 0
EYE REDNESS: 0
DIARRHEA: 0
ABDOMINAL PAIN: 0

## 2024-11-18 NOTE — PATIENT INSTRUCTIONS

## 2024-11-18 NOTE — PROGRESS NOTES
[facility]  2024    Marko Warner Jr (: 1940) is a 84 y.o. male, here for the following medical concerns:    Chief Complaint   Patient presents with    Hypertension    Diabetes        ASSESSMENT/ PLAN  1. Type 2 diabetes mellitus with hyperglycemia, without long-term current use of insulin (HCC)  Doing well on metformin 1000 BID and farxiga 5 daily. Will continue this for now. Update A1c at follow. If elevated, will increase farxiga to 10 mg daily. Follow up as below.     2. Primary hypertension  Controlled: Appears stable.  We will continue current management and monitor for adverse reaction and disease progression.  Follow-up as noted below       I have spent 30 minutes on chart review, care coordination and patient counseling regarding disease state, lifestyle modifications and/or health maintenance screening.      No follow-ups on file.    Future Appointments   Date Time Provider Department Center   12/3/2024  8:00 AM [facility] BONE DEXA  1 [facility]   2025  9:30 AM Vani Reyes MD [facility] E[facility] DEP         HPI  LOV 24 addressed hospital follow up after dizzy spells. Also addressed blood sugars. Ordered CGM and increased metformin to 1000 mg BID. Discussed ref to endo and starting farxiga if fasting BG is still elevated.     Today he is feeling great. No concerns or complaints. He has his notebook in hand.     Accompanied by daughter who is helping him with home BG and BP checks.     Home numbers:   - fasting: low hundreds (111-131)  - throughout the day can range from 140s to 220s before and after meals, respectively.     Wt Readings from Last 3 Encounters:   24 81.2 kg (179 lb)   24 81.6 kg (180 lb)   10/09/24 81.6 kg (180 lb)     BP Readings from Last 3 Encounters:   24 124/72   24 120/76   10/09/24 122/74     ROS  Review of Systems   Constitutional:  Negative for activity change, chills, fatigue and fever.   HENT:

## 2024-12-03 ENCOUNTER — HOSPITAL ENCOUNTER (OUTPATIENT)
Facility: HOSPITAL | Age: 84
Discharge: HOME | End: 2024-12-06
Payer: MEDICARE

## 2024-12-03 DIAGNOSIS — M81.0 SENILE OSTEOPOROSIS: ICD-10-CM

## 2024-12-03 PROCEDURE — 77080 DXA BONE DENSITY AXIAL: CPT

## 2024-12-13 ENCOUNTER — TELEPHONE (OUTPATIENT)
Facility: CLINIC | Age: 84
End: 2024-12-13

## 2024-12-13 NOTE — TELEPHONE ENCOUNTER
Pt states that he is returning a call. States that no message was left and he thinks it might be in reference to results of his Dexa Scan. Pt aware that nurse and DR Reyes is out of the office. And this will be addressed Monday. Please advise.

## 2025-02-06 NOTE — TELEPHONE ENCOUNTER
This patient contacted office for the following prescriptions to be filled:    Medication requested :   olmesartan-hydroCHLOROthiazide (BENICAR HCT) 40-12.5 MG per tablet QTY 90    metFORMIN (GLUCOPHAGE-XR) 500 MG extended release tablet     dapagliflozin (FARXIGA) 5 MG tablet QTY 90      PCP: Amy  Pharmacy or Print: [facility]  Mail order or Local pharmacy Mail Order     Scheduled appointment if not seen by current providers in office: LOV 11/18/2024 AZEEM 4/30/2025

## 2025-02-07 NOTE — TELEPHONE ENCOUNTER
This patient contacted the office for the following prescriptions to be refilled:    Medication requested :   Requested Prescriptions     Pending Prescriptions Disp Refills    olmesartan-hydroCHLOROthiazide (BENICAR HCT) 40-12.5 MG per tablet 30 tablet 3     Sig: Take 1 tablet by mouth daily    metFORMIN (GLUCOPHAGE-XR) 500 MG extended release tablet 60 tablet 3     Sig: Take 2 tablets by mouth 2 times daily    dapagliflozin (FARXIGA) 5 MG tablet 30 tablet 3     Sig: Take 1 tablet by mouth every morning        Last Refilled: 9/2024    Last Office Visit: 11/18/2024    Next Office Visit: 4/30/2025    Last Labs:due 4/2025

## 2025-02-10 RX ORDER — OLMESARTAN MEDOXOMIL AND HYDROCHLOROTHIAZIDE 40/12.5 40; 12.5 MG/1; MG/1
1 TABLET ORAL DAILY
Qty: 30 TABLET | Refills: 2 | Status: SHIPPED | OUTPATIENT
Start: 2025-02-10

## 2025-02-10 RX ORDER — DAPAGLIFLOZIN 5 MG/1
5 TABLET, FILM COATED ORAL EVERY MORNING
Qty: 30 TABLET | Refills: 2 | Status: SHIPPED | OUTPATIENT
Start: 2025-02-10

## 2025-02-10 RX ORDER — METFORMIN HYDROCHLORIDE 500 MG/1
1000 TABLET, EXTENDED RELEASE ORAL 2 TIMES DAILY
Qty: 60 TABLET | Refills: 2 | Status: SHIPPED | OUTPATIENT
Start: 2025-02-10 | End: 2025-02-12 | Stop reason: SDUPTHER

## 2025-02-11 NOTE — TELEPHONE ENCOUNTER
[facility] has not received the request for RX olmesartan-hydroCHLOROthiazide (BENICAR HCT) 40-12.5 MG per tablet     metFORMIN (GLUCOPHAGE-XR) 500 MG extended release tablet      dapagliflozin (FARXIGA) 5 MG tablet   Sent yesterday. Tried to call [facility] but was on hold for 30 minutes.please advise.

## 2025-02-12 RX ORDER — METFORMIN HYDROCHLORIDE 500 MG/1
1000 TABLET, EXTENDED RELEASE ORAL 2 TIMES DAILY
Qty: 360 TABLET | Refills: 0 | Status: SHIPPED | OUTPATIENT
Start: 2025-02-12

## 2025-02-12 NOTE — TELEPHONE ENCOUNTER
Called [facility] to verify receipt , per patient's chart \"E-Prescribing Status: Receipt confirmed by pharmacy (2/10/2025 10:02 AM EST)\". Was advised that the patient has a \"dedicated team\" that the representative needed to notify and was placed on hold. Spoke with Etelvina and confirmed receipt of Benicar HCT 40-12.5, Metformin 500mg ER and Farxiga 5mg refill request, however there was an active refill request on file from the previous PCP for Benicar and Farxiga it is too soon to refill those. The scripts sent on 2/10/2025 are on hold until the medications are due for refill.      Metformin was sent for a 15 day supply the patient takes 2 tabs BID, the refill requests was for 60 tabs. Medication pending for the correct amount, please resend.

## 2025-04-02 ENCOUNTER — HOSPITAL ENCOUNTER (OUTPATIENT)
Facility: HOSPITAL | Age: 85
Discharge: HOME | End: 2025-04-05
Payer: MEDICARE

## 2025-04-02 DIAGNOSIS — E55.9 VITAMIN D DEFICIENCY: ICD-10-CM

## 2025-04-02 DIAGNOSIS — I10 PRIMARY HYPERTENSION: ICD-10-CM

## 2025-04-02 DIAGNOSIS — E11.9 DIABETES MELLITUS TYPE 2, NONINSULIN DEPENDENT (HCC): ICD-10-CM

## 2025-04-02 LAB
25(OH)D3 SERPL-MCNC: 52.7 NG/ML (ref 30–100)
ALBUMIN SERPL-MCNC: 3.6 G/DL (ref 3.4–5)
ALBUMIN/GLOB SERPL: 1.3 (ref 0.8–1.7)
ALP SERPL-CCNC: 106 U/L (ref 45–117)
ALT SERPL-CCNC: 25 U/L (ref 16–61)
ANION GAP SERPL CALC-SCNC: 5 MMOL/L (ref 3–18)
AST SERPL-CCNC: 16 U/L (ref 10–38)
BASOPHILS # BLD: 0.08 K/UL (ref 0–0.1)
BASOPHILS NFR BLD: 1.2 % (ref 0–2)
BILIRUB SERPL-MCNC: 0.4 MG/DL (ref 0.2–1)
BUN SERPL-MCNC: 26 MG/DL (ref 7–18)
BUN/CREAT SERPL: 21 (ref 12–20)
CALCIUM SERPL-MCNC: 10.2 MG/DL (ref 8.5–10.1)
CHLORIDE SERPL-SCNC: 106 MMOL/L (ref 100–111)
CHOLEST SERPL-MCNC: 104 MG/DL
CO2 SERPL-SCNC: 27 MMOL/L (ref 21–32)
CREAT SERPL-MCNC: 1.24 MG/DL (ref 0.6–1.3)
DIFFERENTIAL METHOD BLD: ABNORMAL
EOSINOPHIL # BLD: 0.38 K/UL (ref 0–0.4)
EOSINOPHIL NFR BLD: 5.6 % (ref 0–5)
ERYTHROCYTE [DISTWIDTH] IN BLOOD BY AUTOMATED COUNT: 12.4 % (ref 11.6–14.5)
EST. AVERAGE GLUCOSE BLD GHB EST-MCNC: 166 MG/DL
GLOBULIN SER CALC-MCNC: 2.8 G/DL (ref 2–4)
GLUCOSE SERPL-MCNC: 131 MG/DL (ref 74–99)
HBA1C MFR BLD: 7.4 % (ref 4.2–5.6)
HCT VFR BLD AUTO: 46 % (ref 36–48)
HDLC SERPL-MCNC: 46 MG/DL (ref 40–60)
HDLC SERPL: 2.3 (ref 0–5)
HGB BLD-MCNC: 14.6 G/DL (ref 13–16)
IMM GRANULOCYTES # BLD AUTO: 0.01 K/UL (ref 0–0.04)
IMM GRANULOCYTES NFR BLD AUTO: 0.1 % (ref 0–0.5)
LDLC SERPL CALC-MCNC: 45 MG/DL (ref 0–100)
LIPID PANEL: NORMAL
LYMPHOCYTES # BLD: 1.86 K/UL (ref 0.9–3.6)
LYMPHOCYTES NFR BLD: 27.4 % (ref 21–52)
MCH RBC QN AUTO: 32 PG (ref 24–34)
MCHC RBC AUTO-ENTMCNC: 31.7 G/DL (ref 31–37)
MCV RBC AUTO: 100.9 FL (ref 78–100)
MONOCYTES # BLD: 0.8 K/UL (ref 0.05–1.2)
MONOCYTES NFR BLD: 11.8 % (ref 3–10)
NEUTS SEG # BLD: 3.67 K/UL (ref 1.8–8)
NEUTS SEG NFR BLD: 53.9 % (ref 40–73)
NRBC # BLD: 0 K/UL (ref 0–0.01)
NRBC BLD-RTO: 0 PER 100 WBC
PLATELET # BLD AUTO: 270 K/UL (ref 135–420)
PMV BLD AUTO: 11.8 FL (ref 9.2–11.8)
POTASSIUM SERPL-SCNC: 4.4 MMOL/L (ref 3.5–5.5)
PROT SERPL-MCNC: 6.4 G/DL (ref 6.4–8.2)
RBC # BLD AUTO: 4.56 M/UL (ref 4.35–5.65)
SODIUM SERPL-SCNC: 138 MMOL/L (ref 136–145)
TRIGL SERPL-MCNC: 65 MG/DL
VLDLC SERPL CALC-MCNC: 13 MG/DL
WBC # BLD AUTO: 6.8 K/UL (ref 4.6–13.2)

## 2025-04-02 PROCEDURE — 80061 LIPID PANEL: CPT

## 2025-04-02 PROCEDURE — 36415 COLL VENOUS BLD VENIPUNCTURE: CPT

## 2025-04-02 PROCEDURE — 85025 COMPLETE CBC W/AUTO DIFF WBC: CPT

## 2025-04-02 PROCEDURE — 80053 COMPREHEN METABOLIC PANEL: CPT

## 2025-04-02 PROCEDURE — 83036 HEMOGLOBIN GLYCOSYLATED A1C: CPT

## 2025-04-02 PROCEDURE — 82306 VITAMIN D 25 HYDROXY: CPT

## 2025-04-07 ENCOUNTER — TELEPHONE (OUTPATIENT)
Facility: CLINIC | Age: 85
End: 2025-04-07

## 2025-04-07 NOTE — TELEPHONE ENCOUNTER
Patient came in office today to drop off a letter for Dr. Ludwig to read. Letter is in Dr. Ludwig box.

## 2025-04-09 ENCOUNTER — HOSPITAL ENCOUNTER (OUTPATIENT)
Facility: HOSPITAL | Age: 85
Setting detail: SPECIMEN
Discharge: HOME | End: 2025-04-12
Payer: MEDICARE

## 2025-04-09 ENCOUNTER — OFFICE VISIT (OUTPATIENT)
Facility: CLINIC | Age: 85
End: 2025-04-09
Payer: MEDICARE

## 2025-04-09 VITALS
HEART RATE: 78 BPM | SYSTOLIC BLOOD PRESSURE: 126 MMHG | WEIGHT: 176 LBS | OXYGEN SATURATION: 98 % | RESPIRATION RATE: 16 BRPM | BODY MASS INDEX: 27.62 KG/M2 | DIASTOLIC BLOOD PRESSURE: 78 MMHG | TEMPERATURE: 98.2 F | HEIGHT: 67 IN

## 2025-04-09 VITALS
WEIGHT: 176 LBS | RESPIRATION RATE: 16 BRPM | DIASTOLIC BLOOD PRESSURE: 78 MMHG | TEMPERATURE: 97.9 F | OXYGEN SATURATION: 98 % | HEART RATE: 50 BPM | HEIGHT: 67 IN | BODY MASS INDEX: 27.62 KG/M2 | SYSTOLIC BLOOD PRESSURE: 126 MMHG

## 2025-04-09 DIAGNOSIS — E11.65 TYPE 2 DIABETES MELLITUS WITH HYPERGLYCEMIA, WITHOUT LONG-TERM CURRENT USE OF INSULIN (HCC): ICD-10-CM

## 2025-04-09 DIAGNOSIS — Z00.00 MEDICARE ANNUAL WELLNESS VISIT, INITIAL: Primary | ICD-10-CM

## 2025-04-09 DIAGNOSIS — Z00.00 INITIAL MEDICARE ANNUAL WELLNESS VISIT: ICD-10-CM

## 2025-04-09 DIAGNOSIS — M85.852 OSTEOPENIA OF BOTH HIPS: ICD-10-CM

## 2025-04-09 DIAGNOSIS — E11.65 TYPE 2 DIABETES MELLITUS WITH HYPERGLYCEMIA, WITHOUT LONG-TERM CURRENT USE OF INSULIN (HCC): Primary | ICD-10-CM

## 2025-04-09 DIAGNOSIS — M85.851 OSTEOPENIA OF BOTH HIPS: ICD-10-CM

## 2025-04-09 DIAGNOSIS — I10 PRIMARY HYPERTENSION: ICD-10-CM

## 2025-04-09 PROBLEM — R26.9 GAIT DISTURBANCE: Status: ACTIVE | Noted: 2024-11-01

## 2025-04-09 PROCEDURE — 1160F RVW MEDS BY RX/DR IN RCRD: CPT | Performed by: FAMILY MEDICINE

## 2025-04-09 PROCEDURE — 1123F ACP DISCUSS/DSCN MKR DOCD: CPT | Performed by: FAMILY MEDICINE

## 2025-04-09 PROCEDURE — 3078F DIAST BP <80 MM HG: CPT | Performed by: FAMILY MEDICINE

## 2025-04-09 PROCEDURE — G8427 DOCREV CUR MEDS BY ELIG CLIN: HCPCS | Performed by: FAMILY MEDICINE

## 2025-04-09 PROCEDURE — 1036F TOBACCO NON-USER: CPT | Performed by: FAMILY MEDICINE

## 2025-04-09 PROCEDURE — G8419 CALC BMI OUT NRM PARAM NOF/U: HCPCS | Performed by: FAMILY MEDICINE

## 2025-04-09 PROCEDURE — 82570 ASSAY OF URINE CREATININE: CPT

## 2025-04-09 PROCEDURE — 1159F MED LIST DOCD IN RCRD: CPT | Performed by: FAMILY MEDICINE

## 2025-04-09 PROCEDURE — 1126F AMNT PAIN NOTED NONE PRSNT: CPT | Performed by: FAMILY MEDICINE

## 2025-04-09 PROCEDURE — 99214 OFFICE O/P EST MOD 30 MIN: CPT | Performed by: FAMILY MEDICINE

## 2025-04-09 PROCEDURE — 3051F HG A1C>EQUAL 7.0%<8.0%: CPT | Performed by: FAMILY MEDICINE

## 2025-04-09 PROCEDURE — 3074F SYST BP LT 130 MM HG: CPT | Performed by: FAMILY MEDICINE

## 2025-04-09 PROCEDURE — 82043 UR ALBUMIN QUANTITATIVE: CPT

## 2025-04-09 PROCEDURE — G0438 PPPS, INITIAL VISIT: HCPCS | Performed by: FAMILY MEDICINE

## 2025-04-09 PROCEDURE — 99213 OFFICE O/P EST LOW 20 MIN: CPT | Performed by: FAMILY MEDICINE

## 2025-04-09 SDOH — ECONOMIC STABILITY: FOOD INSECURITY: WITHIN THE PAST 12 MONTHS, YOU WORRIED THAT YOUR FOOD WOULD RUN OUT BEFORE YOU GOT MONEY TO BUY MORE.: NEVER TRUE

## 2025-04-09 SDOH — ECONOMIC STABILITY: FOOD INSECURITY: WITHIN THE PAST 12 MONTHS, THE FOOD YOU BOUGHT JUST DIDN'T LAST AND YOU DIDN'T HAVE MONEY TO GET MORE.: NEVER TRUE

## 2025-04-09 ASSESSMENT — ENCOUNTER SYMPTOMS
ABDOMINAL PAIN: 0
SHORTNESS OF BREATH: 0
DIARRHEA: 0
EYE REDNESS: 0
COUGH: 0
WHEEZING: 0
CHEST TIGHTNESS: 0

## 2025-04-09 ASSESSMENT — PATIENT HEALTH QUESTIONNAIRE - PHQ9
SUM OF ALL RESPONSES TO PHQ QUESTIONS 1-9: 0
2. FEELING DOWN, DEPRESSED OR HOPELESS: NOT AT ALL
1. LITTLE INTEREST OR PLEASURE IN DOING THINGS: NOT AT ALL
SUM OF ALL RESPONSES TO PHQ QUESTIONS 1-9: 0

## 2025-04-09 ASSESSMENT — LIFESTYLE VARIABLES
HOW MANY STANDARD DRINKS CONTAINING ALCOHOL DO YOU HAVE ON A TYPICAL DAY: 1 OR 2
HOW OFTEN DO YOU HAVE A DRINK CONTAINING ALCOHOL: MONTHLY OR LESS

## 2025-04-09 NOTE — PATIENT INSTRUCTIONS
include:    Chest pain or pressure, or a strange feeling in the chest.     Sweating.     Shortness of breath.     Pain, pressure, or a strange feeling in the back, neck, jaw, or upper belly or in one or both shoulders or arms.     Lightheadedness or sudden weakness.     A fast or irregular heartbeat.   After you call 911, the  may tell you to chew 1 adult-strength or 2 to 4 low-dose aspirin. Wait for an ambulance. Do not try to drive yourself.  Watch closely for changes in your health, and be sure to contact your doctor if you have any problems.  Where can you learn more?  Go to https://www.uControl.net/patientEd and enter F075 to learn more about \"A Healthy Heart: Care Instructions.\"  Current as of: July 31, 2024  Content Version: 14.4  © 0065-6862 [employer].   Care instructions adapted under license by [employer]. If you have questions about a medical condition or this instruction, always ask your healthcare professional. [employer], disclaims any warranty or liability for your use of this information.    Personalized Preventive Plan for Marko Warner Jr - 4/9/2025  Medicare offers a range of preventive health benefits. Some of the tests and screenings are paid in full while other may be subject to a deductible, co-insurance, and/or copay.  Some of these benefits include a comprehensive review of your medical history including lifestyle, illnesses that may run in your family, and various assessments and screenings as appropriate.  After reviewing your medical record and screening and assessments performed today your provider may have ordered immunizations, labs, imaging, and/or referrals for you.  A list of these orders (if applicable) as well as your Preventive Care list are included within your After Visit Summary for your review.

## 2025-04-09 NOTE — PROGRESS NOTES
\"Have you been to the ER, urgent care clinic since your last visit?  Hospitalized since your last visit?\"    NO    “Have you seen or consulted any other health care providers outside our system since your last visit?”    YES - When: approximately 3  weeks ago.  Where and Why: [facility].

## 2025-04-09 NOTE — PROGRESS NOTES
deficit present.      Mental Status: He is alert and oriented to person, place, and time. Mental status is at baseline.      Motor: No weakness.      Coordination: Coordination normal.      Gait: Gait normal.   Psychiatric:         Mood and Affect: Mood normal.         Behavior: Behavior normal.         Thought Content: Thought content normal.         Judgment: Judgment normal.         Immunization History   Administered Date(s) Administered    Influenza Virus Vaccine 10/01/2013    Influenza, FLUARIX, FLULAVAL, FLUZONE, (age 6 mo+), AFLURIA, (age 3 y+), IM, Trivalent PF, 0.5mL 11/02/2024    TDaP, ADACEL (age 10y-64y), BOOSTRIX (age 10y+), IM, 0.5mL 04/15/2015       Health Maintenance   Topic Date Due    Diabetic Alb to Cr ratio (uACR) test  Never done    Pneumococcal 50+ years Vaccine (1 of 2 - PCV) Never done    Shingles vaccine (1 of 2) Never done    Respiratory Syncytial Virus (RSV) Pregnant or age 60 yrs+ (1 - 1-dose 75+ series) Never done    COVID-19 Vaccine (1 - 2024-25 season) Never done    DTaP/Tdap/Td vaccine (2 - Td or Tdap) 04/15/2025    Depression Screen  10/09/2025    Lipids  04/02/2026    GFR test (Diabetes, CKD 3-4, OR last GFR 15-59)  04/02/2026    Annual Wellness Visit (Medicare)  04/10/2026    Flu vaccine  Completed    Hepatitis A vaccine  Aged Out    Hepatitis B vaccine  Aged Out    Hib vaccine  Aged Out    Polio vaccine  Aged Out    Meningococcal (ACWY) vaccine  Aged Out    Meningococcal B vaccine  Aged Out       PSH, PMH, SH and  reviewed and noted.  Recent and past labs, tests and consults also reviewed.  Recent or new meds also reviewed.    Vani Reyes MD    This dictation was generated by voice recognition computer software.  Although all attempts are made to edit the dictation for accuracy, there may be errors in the transcription that are not intended.

## 2025-04-09 NOTE — PROGRESS NOTES
Medicare Annual Wellness Visit    Marko Warner Jr is here for No chief complaint on file.    Assessment & Plan   Medicare annual wellness visit, initial  Type 2 diabetes mellitus with hyperglycemia, without long-term current use of insulin (HCC)  -     Albumin/Creatinine Ratio, Urine; Future  Initial Medicare annual wellness visit       Return in 1 year (on 4/9/2026) for Medicare AWV.     Subjective       Patient's complete Health Risk Assessment and screening values have been reviewed and are found in Flowsheets. The following problems were reviewed today and where indicated follow up appointments were made and/or referrals ordered.    No Positive Risk Factors identified today.                                    Objective   Vitals:    04/09/25 1334   BP: 126/78   BP Site: Left Upper Arm   Patient Position: Sitting   BP Cuff Size: Medium Adult   Pulse: 50   Resp: 16   Temp: 97.9 °F (36.6 °C)   TempSrc: Temporal   SpO2: 98%   Weight: 79.8 kg (176 lb)   Height: 1.702 m (5' 7\")      Body mass index is 27.57 kg/m².                  No Known Allergies  Prior to Visit Medications    Medication Sig Taking? Authorizing Provider   metFORMIN (GLUCOPHAGE-XR) 500 MG extended release tablet Take 2 tablets by mouth 2 times daily  Bianca Calvo MD   olmesartan-hydroCHLOROthiazide (BENICAR HCT) 40-12.5 MG per tablet Take 1 tablet by mouth daily  Bianca Calvo MD   dapagliflozin (FARXIGA) 5 MG tablet Take 1 tablet by mouth every morning  Bianca Calvo MD   meclizine (ANTIVERT) 25 MG CHEW Take 1 tablet by mouth 3 times daily as needed  ProviderAvtar MD   triamcinolone (KENALOG) 0.1 % cream Apply 1 Application topically 2 times daily  Avtar Beavers MD   Multiple Vitamins-Minerals (PRESERVISION AREDS) CAPS Take 1 capsule by mouth 2 times daily  Avtar Beavers MD   Continuous Glucose  (FREESTYLE SHANTI 3 READER) LOC 1 each by Does not apply route 3 times daily (before meals)  Amy

## 2025-04-10 LAB
CREAT UR-MCNC: 55 MG/DL (ref 30–125)
MICROALBUMIN UR-MCNC: 103 MG/DL (ref 0–3)
MICROALBUMIN/CREAT UR-RTO: 1873 MG/G (ref 0–30)

## 2025-04-11 ENCOUNTER — RESULTS FOLLOW-UP (OUTPATIENT)
Facility: CLINIC | Age: 85
End: 2025-04-11

## 2025-06-06 RX ORDER — ATORVASTATIN CALCIUM 40 MG/1
40 TABLET, FILM COATED ORAL NIGHTLY
Qty: 30 TABLET | Refills: 3 | Status: SHIPPED | OUTPATIENT
Start: 2025-06-06

## 2025-06-06 NOTE — TELEPHONE ENCOUNTER
This patient contacted office for the following prescriptions to be filled:    Medication requested : Atorzastatin calcium   PCP: [email or web address]   Pharmacy or Print: [facility]  Mail order or Local pharmacy mail order   Last office visit 4/9/25  Next office visit 9/8/25

## 2025-06-09 RX ORDER — METFORMIN HYDROCHLORIDE 500 MG/1
1000 TABLET, EXTENDED RELEASE ORAL 2 TIMES DAILY
Qty: 360 TABLET | Refills: 0 | Status: SHIPPED | OUTPATIENT
Start: 2025-06-09

## 2025-06-09 RX ORDER — OLMESARTAN MEDOXOMIL AND HYDROCHLOROTHIAZIDE 40/12.5 40; 12.5 MG/1; MG/1
1 TABLET ORAL DAILY
Qty: 30 TABLET | Refills: 2 | Status: SHIPPED | OUTPATIENT
Start: 2025-06-09

## 2025-06-09 RX ORDER — DAPAGLIFLOZIN 5 MG/1
5 TABLET, FILM COATED ORAL EVERY MORNING
Qty: 30 TABLET | Refills: 2 | Status: SHIPPED | OUTPATIENT
Start: 2025-06-09

## 2025-06-09 NOTE — TELEPHONE ENCOUNTER
This patient contacted office for the following prescriptions to be filled:    Medication requested :   metFORMIN (GLUCOPHAGE-XR) 500 MG extended release tablet Qty 360    olmesartan-hydroCHLOROthiazide (BENICAR HCT) 40-12.5 MG per tablet QTY 90    dapagliflozin (FARXIGA) 5 MG tablet  QTY 90   PCP: Amy  Pharmacy or Print:  [facility]  Mail order or Local pharmacy Mail Order   Last office visit 4/9/2025   Next office visit 9/8/2025

## 2025-07-30 RX ORDER — DAPAGLIFLOZIN 5 MG/1
5 TABLET, FILM COATED ORAL EVERY MORNING
Qty: 30 TABLET | Refills: 2 | Status: SHIPPED | OUTPATIENT
Start: 2025-07-30